# Patient Record
Sex: MALE | Race: WHITE | NOT HISPANIC OR LATINO | URBAN - METROPOLITAN AREA
[De-identification: names, ages, dates, MRNs, and addresses within clinical notes are randomized per-mention and may not be internally consistent; named-entity substitution may affect disease eponyms.]

---

## 2021-04-21 ENCOUNTER — INPATIENT (INPATIENT)
Facility: HOSPITAL | Age: 71
LOS: 1 days | Discharge: HOME | DRG: 35 | End: 2021-04-23
Attending: SPECIALIST | Admitting: SPECIALIST
Payer: MEDICARE

## 2021-04-21 VITALS
HEIGHT: 69 IN | SYSTOLIC BLOOD PRESSURE: 179 MMHG | RESPIRATION RATE: 18 BRPM | DIASTOLIC BLOOD PRESSURE: 91 MMHG | HEART RATE: 84 BPM | TEMPERATURE: 99 F | WEIGHT: 179.9 LBS | OXYGEN SATURATION: 96 %

## 2021-04-21 DIAGNOSIS — G45.3 AMAUROSIS FUGAX: ICD-10-CM

## 2021-04-21 LAB
ALBUMIN SERPL ELPH-MCNC: 4.3 G/DL — SIGNIFICANT CHANGE UP (ref 3.3–5)
ALP SERPL-CCNC: 70 U/L — SIGNIFICANT CHANGE UP (ref 40–120)
ALT FLD-CCNC: 15 U/L — SIGNIFICANT CHANGE UP (ref 10–45)
ANION GAP SERPL CALC-SCNC: 10 MMOL/L — SIGNIFICANT CHANGE UP (ref 5–17)
APPEARANCE UR: CLEAR — SIGNIFICANT CHANGE UP
APTT BLD: 35 SEC — SIGNIFICANT CHANGE UP (ref 27.5–35.5)
AST SERPL-CCNC: 18 U/L — SIGNIFICANT CHANGE UP (ref 10–40)
BACTERIA # UR AUTO: NEGATIVE — SIGNIFICANT CHANGE UP
BASE EXCESS BLDV CALC-SCNC: 0.2 MMOL/L — SIGNIFICANT CHANGE UP (ref -2–2)
BASOPHILS # BLD AUTO: 0.05 K/UL — SIGNIFICANT CHANGE UP (ref 0–0.2)
BASOPHILS NFR BLD AUTO: 0.4 % — SIGNIFICANT CHANGE UP (ref 0–2)
BILIRUB SERPL-MCNC: 0.5 MG/DL — SIGNIFICANT CHANGE UP (ref 0.2–1.2)
BILIRUB UR-MCNC: NEGATIVE — SIGNIFICANT CHANGE UP
BUN SERPL-MCNC: 20 MG/DL — SIGNIFICANT CHANGE UP (ref 7–23)
CALCIUM SERPL-MCNC: 9.3 MG/DL — SIGNIFICANT CHANGE UP (ref 8.4–10.5)
CHLORIDE SERPL-SCNC: 106 MMOL/L — SIGNIFICANT CHANGE UP (ref 96–108)
CO2 BLDV-SCNC: 26 MMOL/L — SIGNIFICANT CHANGE UP (ref 22–30)
CO2 SERPL-SCNC: 22 MMOL/L — SIGNIFICANT CHANGE UP (ref 22–31)
COLOR SPEC: SIGNIFICANT CHANGE UP
CREAT SERPL-MCNC: 0.78 MG/DL — SIGNIFICANT CHANGE UP (ref 0.5–1.3)
DIFF PNL FLD: NEGATIVE — SIGNIFICANT CHANGE UP
EOSINOPHIL # BLD AUTO: 0.11 K/UL — SIGNIFICANT CHANGE UP (ref 0–0.5)
EOSINOPHIL NFR BLD AUTO: 0.9 % — SIGNIFICANT CHANGE UP (ref 0–6)
EPI CELLS # UR: 0 /HPF — SIGNIFICANT CHANGE UP
ERYTHROCYTE [SEDIMENTATION RATE] IN BLOOD: 21 MM/HR — HIGH (ref 0–20)
GAS PNL BLDV: SIGNIFICANT CHANGE UP
GLUCOSE SERPL-MCNC: 102 MG/DL — HIGH (ref 70–99)
GLUCOSE UR QL: NEGATIVE — SIGNIFICANT CHANGE UP
HCO3 BLDV-SCNC: 25 MMOL/L — SIGNIFICANT CHANGE UP (ref 21–29)
HCT VFR BLD CALC: 44.3 % — SIGNIFICANT CHANGE UP (ref 39–50)
HGB BLD-MCNC: 14.5 G/DL — SIGNIFICANT CHANGE UP (ref 13–17)
HYALINE CASTS # UR AUTO: 0 /LPF — SIGNIFICANT CHANGE UP (ref 0–2)
IMM GRANULOCYTES NFR BLD AUTO: 0.4 % — SIGNIFICANT CHANGE UP (ref 0–1.5)
INR BLD: 1.1 RATIO — SIGNIFICANT CHANGE UP (ref 0.88–1.16)
KETONES UR-MCNC: ABNORMAL
LEUKOCYTE ESTERASE UR-ACNC: NEGATIVE — SIGNIFICANT CHANGE UP
LYMPHOCYTES # BLD AUTO: 1.33 K/UL — SIGNIFICANT CHANGE UP (ref 1–3.3)
LYMPHOCYTES # BLD AUTO: 10.9 % — LOW (ref 13–44)
MCHC RBC-ENTMCNC: 29.8 PG — SIGNIFICANT CHANGE UP (ref 27–34)
MCHC RBC-ENTMCNC: 32.7 GM/DL — SIGNIFICANT CHANGE UP (ref 32–36)
MCV RBC AUTO: 91 FL — SIGNIFICANT CHANGE UP (ref 80–100)
MONOCYTES # BLD AUTO: 0.78 K/UL — SIGNIFICANT CHANGE UP (ref 0–0.9)
MONOCYTES NFR BLD AUTO: 6.4 % — SIGNIFICANT CHANGE UP (ref 2–14)
NEUTROPHILS # BLD AUTO: 9.85 K/UL — HIGH (ref 1.8–7.4)
NEUTROPHILS NFR BLD AUTO: 81 % — HIGH (ref 43–77)
NITRITE UR-MCNC: NEGATIVE — SIGNIFICANT CHANGE UP
NRBC # BLD: 0 /100 WBCS — SIGNIFICANT CHANGE UP (ref 0–0)
PCO2 BLDV: 43 MMHG — SIGNIFICANT CHANGE UP (ref 42–55)
PH BLDV: 7.38 — SIGNIFICANT CHANGE UP (ref 7.35–7.45)
PH UR: 6 — SIGNIFICANT CHANGE UP (ref 5–8)
PLATELET # BLD AUTO: 227 K/UL — SIGNIFICANT CHANGE UP (ref 150–400)
PO2 BLDV: 40 MMHG — SIGNIFICANT CHANGE UP (ref 25–45)
POTASSIUM SERPL-MCNC: 4.1 MMOL/L — SIGNIFICANT CHANGE UP (ref 3.5–5.3)
POTASSIUM SERPL-SCNC: 4.1 MMOL/L — SIGNIFICANT CHANGE UP (ref 3.5–5.3)
PROT SERPL-MCNC: 7.6 G/DL — SIGNIFICANT CHANGE UP (ref 6–8.3)
PROT UR-MCNC: NEGATIVE — SIGNIFICANT CHANGE UP
PROTHROM AB SERPL-ACNC: 13.1 SEC — SIGNIFICANT CHANGE UP (ref 10.6–13.6)
RBC # BLD: 4.87 M/UL — SIGNIFICANT CHANGE UP (ref 4.2–5.8)
RBC # FLD: 14.6 % — HIGH (ref 10.3–14.5)
RBC CASTS # UR COMP ASSIST: 1 /HPF — SIGNIFICANT CHANGE UP (ref 0–4)
SAO2 % BLDV: 73 % — SIGNIFICANT CHANGE UP (ref 67–88)
SARS-COV-2 RNA SPEC QL NAA+PROBE: SIGNIFICANT CHANGE UP
SODIUM SERPL-SCNC: 138 MMOL/L — SIGNIFICANT CHANGE UP (ref 135–145)
SP GR SPEC: 1.01 — SIGNIFICANT CHANGE UP (ref 1.01–1.02)
TROPONIN T, HIGH SENSITIVITY RESULT: 10 NG/L — SIGNIFICANT CHANGE UP (ref 0–51)
UROBILINOGEN FLD QL: NEGATIVE — SIGNIFICANT CHANGE UP
WBC # BLD: 12.17 K/UL — HIGH (ref 3.8–10.5)
WBC # FLD AUTO: 12.17 K/UL — HIGH (ref 3.8–10.5)
WBC UR QL: 0 /HPF — SIGNIFICANT CHANGE UP (ref 0–5)

## 2021-04-21 PROCEDURE — 93010 ELECTROCARDIOGRAM REPORT: CPT | Mod: GC

## 2021-04-21 PROCEDURE — 99285 EMERGENCY DEPT VISIT HI MDM: CPT | Mod: CS,GC

## 2021-04-21 PROCEDURE — 70496 CT ANGIOGRAPHY HEAD: CPT | Mod: 26,MG

## 2021-04-21 PROCEDURE — G1004: CPT

## 2021-04-21 PROCEDURE — 70498 CT ANGIOGRAPHY NECK: CPT | Mod: 26,MG

## 2021-04-21 RX ORDER — ASPIRIN/CALCIUM CARB/MAGNESIUM 324 MG
81 TABLET ORAL DAILY
Refills: 0 | Status: DISCONTINUED | OUTPATIENT
Start: 2021-04-21 | End: 2021-04-23

## 2021-04-21 RX ORDER — TICAGRELOR 90 MG/1
90 TABLET ORAL
Refills: 0 | Status: DISCONTINUED | OUTPATIENT
Start: 2021-04-21 | End: 2021-04-23

## 2021-04-21 RX ORDER — ATORVASTATIN CALCIUM 80 MG/1
80 TABLET, FILM COATED ORAL AT BEDTIME
Refills: 0 | Status: DISCONTINUED | OUTPATIENT
Start: 2021-04-21 | End: 2021-04-23

## 2021-04-21 RX ORDER — ASPIRIN/CALCIUM CARB/MAGNESIUM 324 MG
325 TABLET ORAL ONCE
Refills: 0 | Status: DISCONTINUED | OUTPATIENT
Start: 2021-04-21 | End: 2021-04-21

## 2021-04-21 RX ORDER — ENOXAPARIN SODIUM 100 MG/ML
40 INJECTION SUBCUTANEOUS DAILY
Refills: 0 | Status: DISCONTINUED | OUTPATIENT
Start: 2021-04-21 | End: 2021-04-23

## 2021-04-21 RX ADMIN — Medication 81 MILLIGRAM(S): at 15:34

## 2021-04-21 RX ADMIN — ATORVASTATIN CALCIUM 80 MILLIGRAM(S): 80 TABLET, FILM COATED ORAL at 22:40

## 2021-04-21 RX ADMIN — TICAGRELOR 90 MILLIGRAM(S): 90 TABLET ORAL at 18:42

## 2021-04-21 NOTE — ED PROVIDER NOTE - EYES, MLM
Clear bilaterally, pupils equal, round and reactive to light. OS 20/20, OD 20/20, OU 20/20. Normal confrontation. Normal visual fields.

## 2021-04-21 NOTE — ED PROVIDER NOTE - PHYSICAL EXAMINATION
Neuro:   AAOx4  Follows commands  No dysarthria  No aphasia  PERRL, EOMI  CN II-XII grossly intact  No facial droop  Facial sensation intact B/L  Tongue midline  5/5 strength B/L UE and LE  Sensation intact BL UE and LE  No truncal ataxia  Normal finger nose finger   Ambulates without difficulty or abnormality  Romberg negative Neuro:   AAOx4  Follows commands  No dysarthria  No aphasia  PERRL, EOMI  CN II-XII grossly intact  No facial droop  Facial sensation intact B/L  Tongue midline  5/5 strength B/L UE and LE  Sensation intact BL UE and LE  No truncal ataxia  Normal finger nose finger   Ambulates without difficulty or abnormality  Romberg negative     Attn - alert, NAD, no pallor or jaundice, PERRL 3 mm, moist mm, skin - warm and dry, Lungs - clear, no w/r/r, good BS bilaterally, Cor - rr, no M, no rub, Abdo - ND, soft, NT, no HSM, no CVAT, no guarding or rebound. Extremities - no edema, no calf tenderness, distal pulses intact and symmetrical, Neuro - intact and non-focal, visual fields intact.

## 2021-04-21 NOTE — ED PROVIDER NOTE - OBJECTIVE STATEMENT
70 y/o male with a h/o HTN and left sciatica, presenting with right eye pressure/head pressure, which started 3 days ago. Patient states he lost vision at the bottom half of his right eye for a few seconds 3 days ago and no vision changes since. Pt states he has also had left leg paresthesias since this morning. Gradual onset, mild-moderate severity, no exacerbating or alleviating factors; associated symptoms include lightheadedness. Denies any chest pain, shortness of breath, nausea, vomiting, abdominal pain, weakness, fever, chills, or cough.         PMD: Dr. Gomes 70 y/o male with a h/o HTN and left sciatica, presenting with right eye pressure/head pressure, which started 3 days ago. Patient states he lost vision at the bottom half of his right eye for a few seconds 3 days ago and no vision changes since. Pt states he has also had left leg paresthesias since this morning. Gradual onset, mild-moderate severity, no exacerbating or alleviating factors; associated symptoms include lightheadedness. Denies any chest pain, shortness of breath, nausea, vomiting, abdominal pain, weakness, fever, chills, or cough.       Attn - pt seen in [facility] - agree with above - pt had episode 3 days ago with loss of vision in Right eye in lower visual field that lasted x 5 mins. no prior episodes or episodes since. pt c/o tearing and "watering" of eye since.  pt has hx of sciatica on left with intermittent "pins and needles" in left foot/leg that he has had previously and can make go away when changes position or walks.  No headache, trauma.          PMD: Dr. Gomes

## 2021-04-21 NOTE — ED ADULT TRIAGE NOTE - CHIEF COMPLAINT QUOTE
right eye pressure and headache x 3 -4 days and numbness to leg, history of sciatica.  Pt denies blurred vision, weakness, and dizziness  no arm drift,  no slurred speech, no facial droop, befast neg

## 2021-04-21 NOTE — H&P ADULT - NSHPPHYSICALEXAM_GEN_ALL_CORE
AOx3, no aphasia, no dysarthria  EOMI, VFF, v1-3 intact, no droop  Motor: no drift x 4  Sensation: intact to LT x 4  Coordination: FNF and HTS no ataxia  No extinction or inattention

## 2021-04-21 NOTE — ED PROVIDER NOTE - CLINICAL SUMMARY MEDICAL DECISION MAKING FREE TEXT BOX
DDX: TIA/CVA, infection, brain mass   Plan: labs, ECG, trop, CTA head/neck DDX: TIA/CVA, infection, brain mass   Plan: labs, ECG, trop, CTA head/neck  Attn - pt with transient Right lower visual field cut that lasted for 5 mins.  CT head, neuro consult, EKG, labs and reassess. DDX: TIA/CVA, infection, brain mass   Plan: labs, ECG, trop, CTA head/neck  Attn - pt with transient Right lower visual field cut that lasted for 5 mins.  CT head, neuro consult, EKG, labs and reassess.    Pt with TIA, amarousis fugax of right eye. No vision changes at this time.   Given aspirin.   Admitted to Dr. Clayton neurology.

## 2021-04-21 NOTE — H&P ADULT - NSHPLABSRESULTS_GEN_ALL_CORE
CBC                       14.5   12.17 )-----------( 227      ( 2021 11:44 )             44.3     Chem     138  |  106  |  20  ----------------------------<  102<H>  4.1   |  22  |  0.78    Ca    9.3      2021 11:44    TPro  7.6  /  Alb  4.3  /  TBili  0.5  /  DBili  x   /  AST  18  /  ALT  15  /  AlkPhos  70  04-21    LFTs LIVER FUNCTIONS - ( 2021 11:44 )  Alb: 4.3 g/dL / Pro: 7.6 g/dL / ALK PHOS: 70 U/L / ALT: 15 U/L / AST: 18 U/L / GGT: x           Coagulopathy PT/INR - ( 2021 11:44 )   PT: 13.1 sec;   INR: 1.10 ratio         PTT - ( 2021 11:44 )  PTT:35.0 sec  Lipid Panel   A1c   Cardiac enzymes     U/A Urinalysis Basic - ( 2021 12:37 )    Color: Light Yellow / Appearance: Clear / S.013 / pH: x  Gluc: x / Ketone: Small  / Bili: Negative / Urobili: Negative   Blood: x / Protein: Negative / Nitrite: Negative   Leuk Esterase: Negative / RBC: 1 /hpf / WBC 0 /HPF   Sq Epi: x / Non Sq Epi: 0 /hpf / Bacteria: Negative      CSF  Immunological  Other        Brain CT: No acute hemorrhage, mass effect or extra-axial collections.      Neck CTA: Ultra high grade stenosis estimated approximately 99% narrowing of the proximal right internal carotid artery with decreased flow throughout the remainder of the cervical and intracranial segments.  Bilateral vertebral artery calcifications and stenosis at the level of the takeoff.  No stenosis of the left-sided circulation.    Brain CTA:    Diffusely decreased caliber of the right internal carotid artery with a decreased filling of the right MCA branches. Patent anterior communicating artery for cross-filling.

## 2021-04-21 NOTE — H&P ADULT - ASSESSMENT
72 yo male pmhx htn and LLE sciatica pw transient vision loss, right eye inferior altitudinal, for approximately one minute.  Painless no precipitating or allievitating factors.  Denies vertigo, headache, chest pain, SOB.  CTA demonstrates proximal R ICA 99% stenosis.  Exam non-focal.  Impression: likely TIA  NIHSS 0 LKN 4/18 MRS 0    Recommendation:  - angio tomorrow 4/21  - MRI brain w/o contrast for infarct, or other organic etiology  - atorvastatin 80 mg PO daily for vascular risk modification  - c/w aspirin 81 mg PO daily for vascular risk modification  - brillinta 90 mg PO BID for vascular risk modification  - NPO after midnight for angio  - DVT ppx      Plan discussed with stroke attending Dr. Jean-Pierre Clayton

## 2021-04-21 NOTE — CONSULT NOTE ADULT - ASSESSMENT
70 yo male pmhx htn and LLE sciatica pw transient vision loss, right eye inferior altitudinal, for approximately one minute.  Painless no precipitating or allievitating factors.  Denies vertigo, headache, chest pain, SOB.  Imaging pending.  Exam non-focal.  Impression: likely TIA  NIHSS 0 LKN 4/18 MRS 0    Recommendation:  - CT / CTA H/N for possible infarct  - MRI brain w/o contrast for infarct, or other organic etiology  - Further management recommendations will be based on initial work up results   70 yo male pmhx htn and LLE sciatica pw transient vision loss, right eye inferior altitudinal, for approximately one minute.  Painless no precipitating or allievitating factors.  Denies vertigo, headache, chest pain, SOB.  Imaging pending.  Exam non-focal.  Impression: likely TIA  NIHSS 0 LKN 4/18 MRS 0    Recommendation:  - CT / CTA H/N for possible infarct  - MRI brain w/o contrast for infarct, or other organic etiology  - atorvastatin 80 mg PO daily for vascular risk modification  - c/w aspirin 81 mg PO daily for vascular risk modification  - Further management recommendations will be based on initial work up results   70 yo male pmhx htn and LLE sciatica pw transient vision loss, right eye inferior altitudinal, for approximately one minute.  Painless no precipitating or allievitating factors.  Denies vertigo, headache, chest pain, SOB.  CTA demonstrates proximal R ICA 99% stenosis.  Exam non-focal.  Impression: likely TIA  NIHSS 0 LKN 4/18 MRS 0    Recommendation:  - CT / CTA H/N for possible infarct  - MRI brain w/o contrast for infarct, or other organic etiology  - atorvastatin 80 mg PO daily for vascular risk modification  - c/w aspirin 81 mg PO daily for vascular risk modification    Follow up with Dr Clayton today  Dr. Sherwin Clayton  [address] [place] [address] [phone] 72 yo male pmhx htn and LLE sciatica pw transient vision loss, right eye inferior altitudinal, for approximately one minute.  Painless no precipitating or allievitating factors.  Denies vertigo, headache, chest pain, SOB.  CTA demonstrates proximal R ICA 99% stenosis.  Exam non-focal.  Impression: likely TIA  NIHSS 0 LKN 4/18 MRS 0    Recommendation:  - CT / CTA H/N for possible infarct  - MRI brain w/o contrast for infarct, or other organic etiology  - atorvastatin 80 mg PO daily for vascular risk modification  - c/w aspirin 81 mg PO daily for vascular risk modification    Follow up with Dr Clayton today  Dr. Sherwin Clayton  [address] [place] [address] [phone]    Case discussed with Dr. Seay

## 2021-04-21 NOTE — CONSULT NOTE ADULT - SUBJECTIVE AND OBJECTIVE BOX
HPI:    70 yo male pmhx htn and LLE sciatica pw transient vision loss, right eye inferior altitudinal, for approximately one minute.  Painless no precipitating or allievitating factors.  Denies vertigo, headache, chest pain, SOB.    NIHSS 0  LKN 18  MRS 0        REVIEW OF SYSTEMS  General:	  Skin/Breast:	  Ophthalmologic:  ENMT:	  Respiratory and Thorax:	  Cardiovascular:	  Gastrointestinal:	  Genitourinary:	  Musculoskeletal:	  Neurological:	  Psychiatric:	  Hematology/Lymphatics:	  Endocrine:	  Allergic/Immunologic:	    A 10-system ROS was performed and is negative except for those items noted above and/or in the HPI.    PAST MEDICAL & SURGICAL HISTORY:  Essential hypertension      FAMILY HISTORY:    SOCIAL HISTORY:   T/E/D:   Occupation:   Lives with:     MEDICATIONS (HOME):  Home Medications:    MEDICATIONS  (STANDING):    MEDICATIONS  (PRN):    ALLERGIES/INTOLERANCES:  Allergies  No Known Allergies    Intolerances    VITALS & EXAMINATION:  Vital Signs Last 24 Hrs  T(C): 37.2 (2021 10:48), Max: 37.2 (2021 10:48)  T(F): 98.9 (2021 10:48), Max: 98.9 (2021 10:48)  HR: 80 (2021 11:06) (80 - 84)  BP: 157/81 (2021 11:06) (157/81 - 179/91)  BP(mean): --  RR: 20 (2021 11:06) (18 - 20)  SpO2: 97% (2021 11:06) (96% - 97%)    Neurological Exam      AOx3, no aphasia, no dysarthria  EOMI, VFF, v1-3 intact, no droop  Motor: no drift x 4  Sensation: intact to LT x 4  Coordination: FNF and HTS no ataxia  No extinction or inattention        LABORATORY:  CBC                       14.5   12.17 )-----------( 227      ( 2021 11:44 )             44.3     Chem 04-21    138  |  106  |  20  ----------------------------<  102<H>  4.1   |  22  |  0.78    Ca    9.3      2021 11:44    TPro  7.6  /  Alb  4.3  /  TBili  0.5  /  DBili  x   /  AST  18  /  ALT  15  /  AlkPhos  70  04-21    LFTs LIVER FUNCTIONS - ( 2021 11:44 )  Alb: 4.3 g/dL / Pro: 7.6 g/dL / ALK PHOS: 70 U/L / ALT: 15 U/L / AST: 18 U/L / GGT: x           Coagulopathy PT/INR - ( 2021 11:44 )   PT: 13.1 sec;   INR: 1.10 ratio         PTT - ( 2021 11:44 )  PTT:35.0 sec  Lipid Panel   A1c   Cardiac enzymes     U/A Urinalysis Basic - ( 2021 12:37 )    Color: Light Yellow / Appearance: Clear / S.013 / pH: x  Gluc: x / Ketone: Small  / Bili: Negative / Urobili: Negative   Blood: x / Protein: Negative / Nitrite: Negative   Leuk Esterase: Negative / RBC: 1 /hpf / WBC 0 /HPF   Sq Epi: x / Non Sq Epi: 0 /hpf / Bacteria: Negative      CSF  Immunological  Other    STUDIES & IMAGING:   HPI:    70 yo male pmhx htn and LLE sciatica pw transient vision loss, right eye inferior altitudinal, for approximately one minute.  Painless no precipitating or allievitating factors.  Denies vertigo, headache, chest pain, SOB.    NIHSS 0  LKN 18  MRS 0        REVIEW OF SYSTEMS  General:	  Skin/Breast:	  Ophthalmologic:  ENMT:	  Respiratory and Thorax:	  Cardiovascular:	  Gastrointestinal:	  Genitourinary:	  Musculoskeletal:	  Neurological:	  Psychiatric:	  Hematology/Lymphatics:	  Endocrine:	  Allergic/Immunologic:	    A 10-system ROS was performed and is negative except for those items noted above and/or in the HPI.    PAST MEDICAL & SURGICAL HISTORY:  Essential hypertension      FAMILY HISTORY:    SOCIAL HISTORY:   T/E/D:   Occupation:   Lives with:     MEDICATIONS (HOME):  Home Medications:    MEDICATIONS  (STANDING):    MEDICATIONS  (PRN):    ALLERGIES/INTOLERANCES:  Allergies  No Known Allergies    Intolerances    VITALS & EXAMINATION:  Vital Signs Last 24 Hrs  T(C): 37.2 (2021 10:48), Max: 37.2 (2021 10:48)  T(F): 98.9 (2021 10:48), Max: 98.9 (2021 10:48)  HR: 80 (2021 11:06) (80 - 84)  BP: 157/81 (2021 11:06) (157/81 - 179/91)  BP(mean): --  RR: 20 (2021 11:06) (18 - 20)  SpO2: 97% (2021 11:06) (96% - 97%)    Neurological Exam      AOx3, no aphasia, no dysarthria  EOMI, VFF, v1-3 intact, no droop  Motor: no drift x 4  Sensation: intact to LT x 4  Coordination: FNF and HTS no ataxia  No extinction or inattention        LABORATORY:  CBC                       14.5   12.17 )-----------( 227      ( 2021 11:44 )             44.3     Chem 04-21    138  |  106  |  20  ----------------------------<  102<H>  4.1   |  22  |  0.78    Ca    9.3      2021 11:44    TPro  7.6  /  Alb  4.3  /  TBili  0.5  /  DBili  x   /  AST  18  /  ALT  15  /  AlkPhos  70  04-    LFTs LIVER FUNCTIONS - ( 2021 11:44 )  Alb: 4.3 g/dL / Pro: 7.6 g/dL / ALK PHOS: 70 U/L / ALT: 15 U/L / AST: 18 U/L / GGT: x           Coagulopathy PT/INR - ( 2021 11:44 )   PT: 13.1 sec;   INR: 1.10 ratio         PTT - ( 2021 11:44 )  PTT:35.0 sec  Lipid Panel   A1c   Cardiac enzymes     U/A Urinalysis Basic - ( 2021 12:37 )    Color: Light Yellow / Appearance: Clear / S.013 / pH: x  Gluc: x / Ketone: Small  / Bili: Negative / Urobili: Negative   Blood: x / Protein: Negative / Nitrite: Negative   Leuk Esterase: Negative / RBC: 1 /hpf / WBC 0 /HPF   Sq Epi: x / Non Sq Epi: 0 /hpf / Bacteria: Negative      CSF  Immunological  Other    STUDIES & IMAGING:  < from: CT Head No Cont (21 @ 13:46) >  IMPRESSION:    Brain CT: No acute hemorrhage, mass effect or extra-axial collections.      Neck CTA: Ultra high grade stenosis estimated approximately 99% narrowing of the proximal right internal carotid artery with decreased flow throughout the remainder of the cervical and intracranial segments.  Bilateral vertebral artery calcifications and stenosis at the level of the takeoff.  No stenosis of the left-sided circulation.    Brain CTA:    Diffusely decreased caliber of the right internal carotid artery with a decreased filling of the right MCA branches. Patent anterior communicating artery for cross-filling.

## 2021-04-21 NOTE — H&P ADULT - ATTENDING COMMENTS
VASCULAR NEUROLOGY ATTENDING  The patient is seen and examined the history and imaging are reviewed. I agree with the resident note unless otherwise noted. Presumed R hemispheric and occular ischemia from R ICA artery to artery embolism. Agree with DAPT. Plan for cerebral angiography and possible stent. MRI brain when able and early hospital discharge.

## 2021-04-21 NOTE — ED ADULT NURSE NOTE - OBJECTIVE STATEMENT
72 yo M w/ PMHx of HTN presents to ED via waiting room c/o headache/pressure behind R eye x3 days. Pt states brief episode of "loss of the bottom half of my vision in my right eye" 3 days ago, quickly returned to baseline, no changes since that time. Denies recent falls/injuries. C/o L leg minor numbness which started this morning, hx of sciatica on L side. Pt denies any CP, SOB, cough, N/V, fever, chills, urinary complaints, constipation, diarrhea, HA, dizziness, weakness. Pt A&Ox4, lungs CTA, +central pulses. Abdomen soft, not tender, not distended. Ambulating w/ steady gait, safety and comfort maintained, no acute distress noted at this time. Pt denies any recent travel or known sick contacts.

## 2021-04-21 NOTE — H&P ADULT - HISTORY OF PRESENT ILLNESS
70 yo male pmhx htn and LLE sciatica pw transient vision loss, right eye inferior altitudinal, for approximately one minute.  Painless no precipitating or allievitating factors.  Denies vertigo, headache, chest pain, SOB.    NIHSS 0  LKN 4/18  MRS 0

## 2021-04-22 ENCOUNTER — TRANSCRIPTION ENCOUNTER (OUTPATIENT)
Age: 71
End: 2021-04-22

## 2021-04-22 LAB
A1C WITH ESTIMATED AVERAGE GLUCOSE RESULT: 5.6 % — SIGNIFICANT CHANGE UP (ref 4–5.6)
ANION GAP SERPL CALC-SCNC: 11 MMOL/L — SIGNIFICANT CHANGE UP (ref 5–17)
APTT BLD: 33.8 SEC — SIGNIFICANT CHANGE UP (ref 27.5–35.5)
BLD GP AB SCN SERPL QL: NEGATIVE — SIGNIFICANT CHANGE UP
BUN SERPL-MCNC: 15 MG/DL — SIGNIFICANT CHANGE UP (ref 7–23)
CALCIUM SERPL-MCNC: 9 MG/DL — SIGNIFICANT CHANGE UP (ref 8.4–10.5)
CHLORIDE SERPL-SCNC: 107 MMOL/L — SIGNIFICANT CHANGE UP (ref 96–108)
CHOLEST SERPL-MCNC: 236 MG/DL — HIGH
CO2 SERPL-SCNC: 21 MMOL/L — LOW (ref 22–31)
COVID-19 SPIKE DOMAIN AB INTERP: NEGATIVE — SIGNIFICANT CHANGE UP
COVID-19 SPIKE DOMAIN ANTIBODY RESULT: 0.4 U/ML — SIGNIFICANT CHANGE UP
CREAT SERPL-MCNC: 0.67 MG/DL — SIGNIFICANT CHANGE UP (ref 0.5–1.3)
ESTIMATED AVERAGE GLUCOSE: 114 MG/DL — SIGNIFICANT CHANGE UP (ref 68–114)
GLUCOSE SERPL-MCNC: 95 MG/DL — SIGNIFICANT CHANGE UP (ref 70–99)
HCV AB S/CO SERPL IA: 0.16 S/CO — SIGNIFICANT CHANGE UP (ref 0–0.99)
HCV AB SERPL-IMP: SIGNIFICANT CHANGE UP
HDLC SERPL-MCNC: 53 MG/DL — SIGNIFICANT CHANGE UP
INR BLD: 1.12 RATIO — SIGNIFICANT CHANGE UP (ref 0.88–1.16)
LIPID PNL WITH DIRECT LDL SERPL: 172 MG/DL — HIGH
NON HDL CHOLESTEROL: 183 MG/DL — HIGH
PA ADP PRP-ACNC: 36 PRU — LOW (ref 194–417)
PA ADP PRP-ACNC: 44 PRU — LOW (ref 194–417)
PLATELET RESPONSE ASPIRIN RESULT: 505 ARU — SIGNIFICANT CHANGE UP (ref 350–700)
POTASSIUM SERPL-MCNC: 3.8 MMOL/L — SIGNIFICANT CHANGE UP (ref 3.5–5.3)
POTASSIUM SERPL-SCNC: 3.8 MMOL/L — SIGNIFICANT CHANGE UP (ref 3.5–5.3)
PROTHROM AB SERPL-ACNC: 13.4 SEC — SIGNIFICANT CHANGE UP (ref 10.6–13.6)
RH IG SCN BLD-IMP: POSITIVE — SIGNIFICANT CHANGE UP
SARS-COV-2 IGG+IGM SERPL QL IA: 0.4 U/ML — SIGNIFICANT CHANGE UP
SARS-COV-2 IGG+IGM SERPL QL IA: NEGATIVE — SIGNIFICANT CHANGE UP
SODIUM SERPL-SCNC: 139 MMOL/L — SIGNIFICANT CHANGE UP (ref 135–145)
TRIGL SERPL-MCNC: 54 MG/DL — SIGNIFICANT CHANGE UP

## 2021-04-22 PROCEDURE — 93306 TTE W/DOPPLER COMPLETE: CPT | Mod: 26

## 2021-04-22 PROCEDURE — 37216 TRANSCATH STENT CCA W/O EPS: CPT

## 2021-04-22 PROCEDURE — 99232 SBSQ HOSP IP/OBS MODERATE 35: CPT

## 2021-04-22 RX ORDER — DEXTROSE MONOHYDRATE, SODIUM CHLORIDE, AND POTASSIUM CHLORIDE 50; .745; 4.5 G/1000ML; G/1000ML; G/1000ML
1000 INJECTION, SOLUTION INTRAVENOUS
Refills: 0 | Status: DISCONTINUED | OUTPATIENT
Start: 2021-04-22 | End: 2021-04-23

## 2021-04-22 RX ORDER — PHENYLEPHRINE HYDROCHLORIDE 10 MG/ML
0.98 INJECTION INTRAVENOUS
Qty: 160 | Refills: 0 | Status: DISCONTINUED | OUTPATIENT
Start: 2021-04-22 | End: 2021-04-22

## 2021-04-22 RX ORDER — PHENYLEPHRINE HYDROCHLORIDE 10 MG/ML
0.6 INJECTION INTRAVENOUS
Qty: 40 | Refills: 0 | Status: DISCONTINUED | OUTPATIENT
Start: 2021-04-22 | End: 2021-04-22

## 2021-04-22 RX ORDER — SODIUM CHLORIDE 9 MG/ML
1000 INJECTION INTRAMUSCULAR; INTRAVENOUS; SUBCUTANEOUS ONCE
Refills: 0 | Status: COMPLETED | OUTPATIENT
Start: 2021-04-22 | End: 2021-04-22

## 2021-04-22 RX ADMIN — ATORVASTATIN CALCIUM 80 MILLIGRAM(S): 80 TABLET, FILM COATED ORAL at 21:10

## 2021-04-22 RX ADMIN — ENOXAPARIN SODIUM 40 MILLIGRAM(S): 100 INJECTION SUBCUTANEOUS at 21:11

## 2021-04-22 RX ADMIN — TICAGRELOR 90 MILLIGRAM(S): 90 TABLET ORAL at 05:31

## 2021-04-22 RX ADMIN — DEXTROSE MONOHYDRATE, SODIUM CHLORIDE, AND POTASSIUM CHLORIDE 50 MILLILITER(S): 50; .745; 4.5 INJECTION, SOLUTION INTRAVENOUS at 17:46

## 2021-04-22 RX ADMIN — Medication 81 MILLIGRAM(S): at 10:30

## 2021-04-22 RX ADMIN — SODIUM CHLORIDE 1000 MILLILITER(S): 9 INJECTION INTRAMUSCULAR; INTRAVENOUS; SUBCUTANEOUS at 14:38

## 2021-04-22 RX ADMIN — TICAGRELOR 90 MILLIGRAM(S): 90 TABLET ORAL at 18:31

## 2021-04-22 NOTE — PROGRESS NOTE ADULT - ASSESSMENT
ASSESSMENT/PLAN: amaurosis fugax s/p carotid stent for severe stenosis     NEURO:  neuro checks  carotid doppler  CT Head in AM, MRI post-op, Surgical drains per NSGY, Pain control  Activity: [] OOB as tolerated [] Bedrest [] PT [] OT [] PMNR    PULM:  Incentive spirometry, mobilize as tolerated    CV:  -140mmHg, d/c a-line in AM  wean of paxton gtt; 2/2 intraprocedural cardene and vagal stimulation from carotid stenting  adequate fluid resuscitation    RENAL:  IVF until good PO intake, d/c galeas in AM    GI:  Diet: Dysphagia screen and then advance diet as tolerated  GI prophylaxis [] not indicated [] PPI [] other:  Bowel regimen [] colace [] senna [] other:    ENDO:   Goal euglycemia (-180)    HEME/ONC:  VTE prophylaxis: [] SCDs [] chemoprophylaxis [] hold chemoprophylaxis due to: [] high risk of DVT/PE on admission due to:    ID:  Ирина-op antibiotics    MISC:    SOCIAL/FAMILY:  [] awaiting [] updated at bedside [] family meeting    CODE STATUS:  [] Full Code [] DNR [] DNI [] Palliative/Comfort Care    DISPOSITION:  [] ICU [] Stroke Unit [] Floor [] EMU [] RCU [] PCU    [] Patient is at high risk of neurologic deterioration/death due to:     Time seen:  Time spent: ___ [] critical care minutes    Contact: [phone] ASSESSMENT/PLAN: TIA/amaurosis fugax s/p carotid stent for severe stenosis     NEURO:  neuro checks  carotid doppler  DAPT, ARU/PRU  high dose statin    MR  Activity: [] OOB as tolerated [] Bedrest [x PT [x] OT [] PMNR    PULM:  Incentive spirometry, mobilize as tolerated    CV:  -140mmHg, d/c a-line in AM  wean of paxton gtt; 2/2 intraprocedural cardene and vagal stimulation from carotid stenting  adequate fluid resuscitation    RENAL:  IVF until good PO intake, d/c galeas in AM    GI:  Diet: Dysphagia screen and then advance diet as tolerated  GI prophylaxis [] not indicated [] PPI [] other:  Bowel regimen [] colace [] senna [] other:    ENDO:   Goal euglycemia (-180)  a1c 5.6    HEME/ONC:  VTE prophylaxis: [] SCDs [] chemoprophylaxis [] hold chemoprophylaxis due to: [] high risk of DVT/PE on admission due to:    ID:  Ирина-op antibiotics    MISC:    SOCIAL/FAMILY:  [] awaiting [] updated at bedside [] family meeting    CODE STATUS:  [] Full Code [] DNR [] DNI [] Palliative/Comfort Care    DISPOSITION:  [] ICU [] Stroke Unit [] Floor [] EMU [] RCU [] PCU    [] Patient is at high risk of neurologic deterioration/death due to:     Time seen:  Time spent: ___ [] critical care minutes    Contact: [phone] ASSESSMENT/PLAN: TIA/amaurosis fugax s/p carotid stent for severe stenosis     NEURO:  neuro checks 2 hr   carotid doppler  DAPT, ARU/PRU  high dose statin    Activity: [] OOB as tolerated [] Bedrest [x PT [x] OT [] PMNR    PULM:  Incentive spirometry, mobilize as tolerated    CV:  -140mmHg, d/c a-line in AM  wean of paxton gtt; 2/2 intraprocedural cardene and vagal stimulation from carotid stenting  adequate fluid resuscitation      RENAL:  IVF until good PO intake, d/c galeas in AM    GI:  Diet: Dysphagia screen and then advance diet as tolerated  GI prophylaxis [] not indicated [] PPI [] other:  Bowel regimen [] colace [] senna [] other:    ENDO:   Goal euglycemia (-180)  a1c 5.6    HEME/ONC:  VTE prophylaxis: [] SCDs [] chemoprophylaxis [] hold chemoprophylaxis due to: [] high risk of DVT/PE on admission due to:    ID:  Ирина-op antibiotics    MISC:    SOCIAL/FAMILY:  [] awaiting [] updated at bedside [] family meeting    CODE STATUS:  [x] Full Code [] DNR [] DNI [] Palliative/Comfort Care    DISPOSITION:  [] ICU [x] Stroke Unit [] Floor [] EMU [] RCU [] PCU    not critical moderate complex medical decision    Contact: [phone]

## 2021-04-22 NOTE — CHART NOTE - NSCHARTNOTEFT_GEN_A_CORE
Interventional Neuro Radiology  Pre-Procedure Note PA-C    This is a 71 year old right hand dominant male with a past medical history significant for LLE sciatica, who presented with transient vision loss, right eye inferior altitudinal, for approximately one minute. Patient was found to have right carotid stenosis. Patient is transported to Neuro IR for a selective cerebral angiography.    Upon exam patient is A + O x 3, speech is fluent, follows commands, moves all extremity, motor 5/5, sensation intact.  Allergies: No Known Allergies  PMHX: hypertension  PSHX:   Social History:   FAMILY HISTORY:    Current Medications: aspirin  chewable 81 milliGRAM(s) Oral daily  atorvastatin 80 milliGRAM(s) Oral at bedtime  enoxaparin Injectable 40 milliGRAM(s) SubCutaneous daily  ticagrelor 90 milliGRAM(s) Oral two times a day      Labs:                         14.5   12.17 )-----------( 227      ( 21 Apr 2021 11:44 )             44.3       04-22    139  |  107  |  15  ----------------------------<  95  3.8   |  21<L>  |  0.67    Ca    9.0      22 Apr 2021 05:58    TPro  7.6  /  Alb  4.3  /  TBili  0.5  /  DBili  x   /  AST  18  /  ALT  15  /  AlkPhos  70  04-21      Blood Bank: 0 positive       Assessment/Plan:   This is a 71 year old right hand dominant male with right carotid stenosis, o neuro-IR for selective cerebral angiography.   Procedure, goals, risks, benefits and alternatives  were discussed with patient and patient's family.  All questions were answered.  Risks include but are not limited to stroke, vessel injury, hemorrhage, and or right  groin hematoma.  Patient demonstrates understanding  of all risks involved with this procedure and wishes to continue.   Appropriate  content was obtained from patient and consent is in the patient's chart. Interventional Neuro Radiology  Pre-Procedure Note PA-C    This is a 71 year old right hand dominant male with a past medical history significant for LLE sciatica, who presented with transient vision loss, right eye inferior altitudinal, for approximately one minute. Patient was found to have right carotid stenosis. Patient is transported to Neuro IR for a selective cerebral angiography.    Upon exam patient is A + O x 3, speech is fluent, follows commands, moves all extremity, motor 5/5, sensation intact.  Allergies: No Known Allergies  PMHX: hypertension  PSHX:   Social History: former tobacco smoker   FAMILY HISTORY: non-contributory     Current Medications: aspirin  chewable 81 milliGRAM(s) Oral daily  atorvastatin 80 milliGRAM(s) Oral at bedtime  enoxaparin Injectable 40 milliGRAM(s) SubCutaneous daily  ticagrelor 90 milliGRAM(s) Oral two times a day      Labs:                         14.5   12.17 )-----------( 227      ( 21 Apr 2021 11:44 )             44.3       04-22    139  |  107  |  15  ----------------------------<  95  3.8   |  21<L>  |  0.67    Ca    9.0      22 Apr 2021 05:58    TPro  7.6  /  Alb  4.3  /  TBili  0.5  /  DBili  x   /  AST  18  /  ALT  15  /  AlkPhos  70  04-21    Blood Bank: 0 positive     Assessment/Plan:   This is a 71 year old right hand dominant male with symptomatic right carotid stenosis, patient is transported to Neuro-IR for selective cerebral angiography. Procedure, goals, risks, benefits and alternatives were discussed with patient. All questions were answered. Risks include but are not limited to stroke, vessel injury, hemorrhage, and or right groin hematoma. Patient demonstrates understanding of all risks involved with this procedure and wishes to continue.  Appropriate content was obtained from patient and consent is in the patient's chart.

## 2021-04-22 NOTE — CHART NOTE - NSCHARTNOTEFT_GEN_A_CORE
Interventional Neuro Radiology  Pre-Procedure Note    This is a 70 yo male pmhx htn and LLE sciatica pw transient vision loss, right eye inferior altitudinal, for approximately one minute.  Painless no precipitating or alleviating factors.  Denies vertigo, headache, chest pain, SOB.    NIHSS 0  LKN 4/18  MRS 0      Neuro Exam: Awake and alert, oriented x3, fluent, normal naming and repetition, follows 3 step commands. Extraocular movements intact, no nystagmus, visual fields full, face symmetric, tongue midline. No drift, 5/5 power x 4 extremities. Normal sensation to LT. Normal finger-to-nose and rapid alternating movements.    PAST MEDICAL & SURGICAL HISTORY:  Essential hypertension        Social History:   Denies tobacco use    FAMILY HISTORY:    No pertinent family history    Allergies: No Known Allergies      Current Medications: aspirin  chewable 81 milliGRAM(s) Oral daily  atorvastatin 80 milliGRAM(s) Oral at bedtime  enoxaparin Injectable 40 milliGRAM(s) SubCutaneous daily  ticagrelor 90 milliGRAM(s) Oral two times a day      Labs:                         14.5   12.17 )-----------( 227      ( 21 Apr 2021 11:44 )             44.3       04-22    139  |  107  |  15  ----------------------------<  95  3.8   |  21<L>  |  0.67    Ca    9.0      22 Apr 2021 05:58    TPro  7.6  /  Alb  4.3  /  TBili  0.5  /  DBili  x   /  AST  18  /  ALT  15  /  AlkPhos  70  04-21        Blood Bank: 04-22-21  O  --  Positive      Assessment/Plan:   This is a 72y/o Male who presents with right carotid stenosis. Patient presents to neuro-IR for selective cerebral angiography, balloon angioplasty and right carotid stent placement. Procedure/ risks/ benefits/ goals/ alternatives were explained. Risks include but are not limited to stroke/ vessel injury/ hemorrhage/ groin hematoma. All questions answered. Informed content obtained from patient. Consent placed in chart.     Kaitlin Molina PA-C  [phone]

## 2021-04-22 NOTE — DISCHARGE NOTE PROVIDER - NSDCCPCAREPLAN_GEN_ALL_CORE_FT
PRINCIPAL DISCHARGE DIAGNOSIS  Diagnosis: Carotid stenosis  Assessment and Plan of Treatment: You underwent conventional angiography with stent placement in right carotid artery.  Please follow up with Dr. Clemente Wise for your carotid stent.      SECONDARY DISCHARGE DIAGNOSES  Diagnosis: Hypertension  Assessment and Plan of Treatment:      PRINCIPAL DISCHARGE DIAGNOSIS  Diagnosis: Carotid stenosis  Assessment and Plan of Treatment: You underwent conventional angiography with stent placement in right carotid artery.  Please follow up with Dr. Clemente Wise for your carotid stent.  Start taking Brillinta 90mg in addition to your aspirin. Start taking atorvastatin 80mg once daily at night. Continue taking your home medications as prescribed. Monitor your blood pressure. Reduce fat, cholesterol and salt in your diet. Increase intake of fruits and vegetables. Limit alcohol to minimum and do not smoke. Keep up to date on vaccinations.  If you experience any symptoms of facial drooping, slurred speech, arm or leg weakness, severe headache, vision changes or any worsening symptoms, notify provider immediatley and return to ER.        SECONDARY DISCHARGE DIAGNOSES  Diagnosis: Hypertension  Assessment and Plan of Treatment: Continue taking your home medications. Monitor your blood pressure regularly.     PRINCIPAL DISCHARGE DIAGNOSIS  Diagnosis: Carotid stenosis  Assessment and Plan of Treatment: You underwent conventional angiography with stent placement in right carotid artery.  Please follow up with Dr. Clemente Wise for your carotid stent.  Start taking Brillinta 90mg twice a day 12 hours apart in addition to your aspirin 81mg which you should take once a day. Start taking atorvastatin 80mg once daily at night. Continue taking your home medications as prescribed. Monitor your blood pressure. Reduce fat, cholesterol and salt in your diet. Increase intake of fruits and vegetables. Limit alcohol to minimum and do not smoke. Keep up to date on vaccinations.  If you experience any symptoms of facial drooping, slurred speech, arm or leg weakness, severe headache, vision changes or any worsening symptoms, notify provider immediatley and return to ER.  Avoid any heavy lifting, straining for 1 week. Avoid hot tubs, pools, saunas, steam rooms for 1 week.        SECONDARY DISCHARGE DIAGNOSES  Diagnosis: Hypertension  Assessment and Plan of Treatment: Continue taking your home medications. Monitor your blood pressure regularly.

## 2021-04-22 NOTE — CONSULT NOTE ADULT - SUBJECTIVE AND OBJECTIVE BOX
Patient is a 71y old  Male who presents with a chief complaint of 99% R ICA stenosis (2021 15:46)    Admission HPI:  70 yo male pmhx htn and LLE sciatica pw transient vision loss, right eye inferior altitudinal, for approximately one minute.  Painless no precipitating or allievitating factors.  Denies vertigo, headache, chest pain, SOB.    NIHSS 0  LKN /18  MRS 0   (2021 15:46)    Interval History:  Patient had imaging     CT Head No Cont (21 @ 13:46) >  Brain CT: No acute hemorrhage, mass effect or extra-axial collections.    Neck CTA: Ultra high grade stenosis estimated approximately 99% narrowing of the proximal right internal carotid artery with decreased flow throughout the remainder of the cervical and intracranial segments.  Bilateral vertebral artery calcifications and stenosis at the level of the takeoff.  No stenosis of the left-sided circulation.    Brain CTA:  Diffusely decreased caliber of the right internal carotid artery with a decreased filling of the right MCA branches. Patent anterior communicating artery for cross-filling.      REVIEW OF SYSTEMS: No chest pain, shortness of breath, nausea, vomiting or diarhea; other ROS neg     PAST MEDICAL & SURGICAL HISTORY  Essential hypertension    FUNCTIONAL HISTORY:   Lives   Independent    CURRENT FUNCTIONAL STATUS:      FAMILY HISTORY       MEDICATIONS   aspirin  chewable 81 milliGRAM(s) Oral daily  atorvastatin 80 milliGRAM(s) Oral at bedtime  enoxaparin Injectable 40 milliGRAM(s) SubCutaneous daily  ticagrelor 90 milliGRAM(s) Oral two times a day      ALLERGIES  No Known Allergies      VITALS  T(C): 36.9 (21 @ 22:30), Max: 37.2 (21 @ 10:48)  HR: 79 (21 @ 08:00) (64 - 88)  BP: 137/84 (21 @ 08:00) (137/84 - 179/91)  RR: 18 (21 @ 08:00) (12 - 20)  SpO2: 98% (21 @ 08:00) (93% - 98%)  Wt(kg): --    PHYSICAL EXAM  Constitutional - NAD, Comfortable  HEENT - NCAT, EOMI  Neck - Supple, No limited ROM  Chest - CTA bilaterally, No wheeze, No rhonchi, No crackles  Cardiovascular - RRR, S1S2, No murmurs  Abdomen - BS+, Soft, NTND  Extremities - No C/C/E, No calf tenderness   Neurologic Exam -                    Cognitive - Awake, Alert, AAO to self, place, date, year, situation     Communication - Fluent, No dysarthria, no aphasia     Cranial Nerves - CN 2-12 intact     Motor - No focal deficits      Sensory - Intact to LT     Reflexes - DTR Intact, No primitive reflexive  Psychiatric - Mood stable, Affect WNL    RECENT LABS/IMAGING  CBC Full  -  ( 2021 11:44 )  WBC Count : 12.17 K/uL  RBC Count : 4.87 M/uL  Hemoglobin : 14.5 g/dL  Hematocrit : 44.3 %  Platelet Count - Automated : 227 K/uL  Mean Cell Volume : 91.0 fl  Mean Cell Hemoglobin : 29.8 pg  Mean Cell Hemoglobin Concentration : 32.7 gm/dL  Auto Neutrophil # : 9.85 K/uL  Auto Lymphocyte # : 1.33 K/uL  Auto Monocyte # : 0.78 K/uL  Auto Eosinophil # : 0.11 K/uL  Auto Basophil # : 0.05 K/uL  Auto Neutrophil % : 81.0 %  Auto Lymphocyte % : 10.9 %  Auto Monocyte % : 6.4 %  Auto Eosinophil % : 0.9 %  Auto Basophil % : 0.4 %    -22    139  |  107  |  15  ----------------------------<  95  3.8   |  21<L>  |  0.67    Ca    9.0      2021 05:58    TPro  7.6  /  Alb  4.3  /  TBili  0.5  /  DBili  x   /  AST  18  /  ALT  15  /  AlkPhos  70  04-21    Urinalysis Basic - ( 2021 12:37 )    Color: Light Yellow / Appearance: Clear / S.013 / pH: x  Gluc: x / Ketone: Small  / Bili: Negative / Urobili: Negative   Blood: x / Protein: Negative / Nitrite: Negative   Leuk Esterase: Negative / RBC: 1 /hpf / WBC 0 /HPF   Sq Epi: x / Non Sq Epi: 0 /hpf / Bacteria: Negative    Impression:  70 yo admitted with visual loss    Plan:  Continue neurology work up and treatment.  PT- ROM, Bed Mob, Transfers, Amb w AD and bracing as needed  OT- ADLs, bracing  SLP- Dysphagia eval and treat  Prec- Falls, Cardiac  DVT Prophylaxis  Skin- Turn q2 h  Dispo-

## 2021-04-22 NOTE — PRE-OP CHECKLIST - BP NONINVASIVE DIASTOLIC (MM HG)
Ambulatory Care Management  Follow up Outreach Note   Outreach type: Phone call: Yes     Date/Time of Outreach: 11/30/20, 1504     Reason for follow-up:   Continued outreach   Disease specific complaints/issues: None     Patient progress towards goals set from last contact:   Stable   Has patient attended any PCP or specialist follow-up appointments since last contact? What was outcome of appointment? When is next follow-up scheduled? Pt reports Dr Tereza Flynn no longer in network w/ her insurance, working on another PCP. Declined this RNs offer of assistance. Review medications. Any medication changes since last outreach? Does patient have any questions or issues related to their medications? Reports took some [place] from ED for \"side pain\". It is better. Home health active? If yes  any issue? Progress? NA   Referrals needed?  (SW, Diabetes education, HH, etc. ) NA   Other issues/Miscellaneous? (Transportation, access to meals, ability to perform ADLs, adequate caregiver support, etc.)   Discussed most recent A1C = 8.9, reviewed low carb diet. Support, encouragement offered.          Next Outreach Scheduled: 1-2 weeks     Next Steps/Goals:   F/U   Ambulatory Care Manager/ LPN Care Coordinator: Anita Rucker RN [phone]

## 2021-04-22 NOTE — PROGRESS NOTE ADULT - SUBJECTIVE AND OBJECTIVE BOX
SUMMARY: 72 yo male pmhx htn and LLE sciatica pw transient vision loss, right eye inferior altitudinal, for approximately one minute, found to have  Ultra high grade stenosis estimated approximately 99% narrowing of the proximal right internal carotid artery with decreased flow throughout the remainder of the cervical and intracranial segments on CTA; scheduled for carotid stent on 4/22     REVIEW OF SYSTEMS:    VITALS: [] Reviewed    IMAGING/DATA: [] Reviewed    IVF FLUIDS/MEDICATIONS: [] Reviewed    ALLERGIES: Allergies    No Known Allergies    Intolerances        DEVICES:   [] Restraints [] PIVs [] ET tube [] central line [] PICC [] arterial line [] galeas [] NGT/OGT [] EVD [] LD [] GIANNA/HMV [] LiCOX [] ICP monitor [] Trach [] PEG [] Chest Tube [] other:    EXAMINATION:  General: No acute distress  HEENT: Anicteric sclerae  Cardiac: [id number]  Lungs: Clear  Abdomen: Soft, non-tender, +BS  Extremities: No c/c/e  Skin/Incision Site: Clean, dry and intact; groin safeguard without any hematoma or ecchymosis  Neurologic: Awake, alert, fully oriented, follows commands, PERRL, VFFtc, EOMI, face symmetric, tongue midline, no drift, full strength              ICU Vital Signs Last 24 Hrs  T(C): 36.9 (22 Apr 2021 13:10), Max: 36.9 (21 Apr 2021 14:50)  T(F): 98.5 (22 Apr 2021 13:10), Max: 98.5 (21 Apr 2021 22:30)  HR: 71 (22 Apr 2021 14:15) (63 - 92)  BP: 129/59 (22 Apr 2021 14:15) (93/54 - 157/86)  BP(mean): 85 (22 Apr 2021 14:15) (68 - 103)  ABP: 88/52 (22 Apr 2021 14:15) (88/52 - 139/57)  ABP(mean): 60 (22 Apr 2021 14:15) (60 - 85)  RR: 13 (22 Apr 2021 14:15) (12 - 20)  SpO2: 96% (22 Apr 2021 14:15) (93% - 98%)            aspirin  chewable 81 milliGRAM(s) Oral daily  atorvastatin 80 milliGRAM(s) Oral at bedtime  enoxaparin Injectable 40 milliGRAM(s) SubCutaneous daily  phenylephrine    Infusion 0.6 MICROgram(s)/kG/Min (18.4 mL/Hr) IV Continuous <Continuous>  phenylephrine    Infusion 0.98 MICROgram(s)/kG/Min (15 mL/Hr) IV Continuous <Continuous>  ticagrelor 90 milliGRAM(s) Oral two times a day      LABS:  Na: 139 (04-22 @ 05:58), 138 (04-21 @ 11:44)  K: 3.8 (04-22 @ 05:58), 4.1 (04-21 @ 11:44)  Cl: 107 (04-22 @ 05:58), 106 (04-21 @ 11:44)  CO2: 21 (04-22 @ 05:58), 22 (04-21 @ 11:44)  BUN: 15 (04-22 @ 05:58), 20 (04-21 @ 11:44)  Cr: 0.67 (04-22 @ 05:58), 0.78 (04-21 @ 11:44)  Glu: 95(04-22 @ 05:58), 102(04-21 @ 11:44)    Hgb: 14.5 (04-21 @ 11:44)  Hct: 44.3 (04-21 @ 11:44)  WBC: 12.17 (04-21 @ 11:44)  Plt: 227 (04-21 @ 11:44)    INR: 1.12 04-22-21 @ 05:58, 1.10 04-21-21 @ 11:44  PTT: 33.8 04-22-21 @ 05:58, 35.0 04-21-21 @ 11:44          LIVER FUNCTIONS - ( 21 Apr 2021 11:44 )  Alb: 4.3 g/dL / Pro: 7.6 g/dL / ALK PHOS: 70 U/L / ALT: 15 U/L / AST: 18 U/L / GGT: x                SUMMARY: 72 yo male pmhx htn and LLE sciatica pw transient vision loss, right eye inferior altitudinal, for approximately one minute, found to have  Ultra high grade stenosis estimated approximately 99% narrowing of the proximal right internal carotid artery with decreased flow throughout the remainder of the cervical and intracranial segments on CTA; scheduled for carotid stent on 4/22     PAST MEDICAL & SURGICAL HISTORY:  Essential hypertension    Allergies    No Known Allergies    Intolerances            REVIEW OF SYSTEMS: [ ] Unable to Assess due to neurologic exam   x[ ] All ROS addressed below are non-contributory, except:  Neuro: [ ] Headache [ ] Back pain [ ] Numbness [ ] Weakness [ ] Ataxia [ ] Dizziness [ ] Aphasia [ ] Dysarthria [ ] Visual disturbance  Resp: [ ] Shortness of breath/dyspnea, [ ] Orthopnea [ ] Cough  CV: [ ] Chest pain [ ] Palpitation [ ] Lightheadedness [ ] Syncope  Renal: [ ] Thirst [ ] Edema  GI: [ ] Nausea [ ] Emesis [ ] Abdominal pain [ ] Constipation [ ] Diarrhea  Hem: [ ] Hematemesis [ ] bright red blood per rectum  ID: [ ] Fever [ ] Chills [ ] Dysuria  ENT: [ ] Rhinorrhea        T(C): 36.9 (04-22-21 @ 16:00), Max: 36.9 (04-21-21 @ 22:30)  HR: 63 (04-22-21 @ 16:30) (62 - 92)  BP: 98/53 (04-22-21 @ 16:30) (93/54 - 157/86)  RR: 12 (04-22-21 @ 16:30) (12 - 19)  SpO2: 96% (04-22-21 @ 16:30) (93% - 98%)aspirin  chewable 81 milliGRAM(s) Oral daily  atorvastatin 80 milliGRAM(s) Oral at bedtime  enoxaparin Injectable 40 milliGRAM(s) SubCutaneous daily  sodium chloride 0.9% with potassium chloride 20 mEq/L 1000 milliLiter(s) IV Continuous <Continuous>  ticagrelor 90 milliGRAM(s) Oral two times a day      IMAGING/DATA: [] Reviewed    IVF FLUIDS/MEDICATIONS: [] Reviewed    ALLERGIES: Allergies    No Known Allergies    Intolerances        DEVICES:   [] Restraints [] PIVs [] ET tube [] central line [] PICC [] arterial line [] galeas [] NGT/OGT [] EVD [] LD [] GIANNA/HMV [] LiCOX [] ICP monitor [] Trach [] PEG [] Chest Tube [] other:    EXAMINATION:  General: No acute distress  HEENT: Anicteric sclerae  Cardiac: [id number]  Lungs: Clear  Abdomen: Soft, non-tender, +BS  Extremities: No c/c/e  Skin/Incision Site: Clean, dry and intact; groin safeguard without any hematoma or ecchymosis  Neurologic: Awake, alert, fully oriented, follows commands, PERRL, VFFtc, EOMI, face symmetric, tongue midline, no drift, full strength              ICU Vital Signs Last 24 Hrs  T(C): 36.9 (22 Apr 2021 13:10), Max: 36.9 (21 Apr 2021 14:50)  T(F): 98.5 (22 Apr 2021 13:10), Max: 98.5 (21 Apr 2021 22:30)  HR: 71 (22 Apr 2021 14:15) (63 - 92)  BP: 129/59 (22 Apr 2021 14:15) (93/54 - 157/86)  BP(mean): 85 (22 Apr 2021 14:15) (68 - 103)  ABP: 88/52 (22 Apr 2021 14:15) (88/52 - 139/57)  ABP(mean): 60 (22 Apr 2021 14:15) (60 - 85)  RR: 13 (22 Apr 2021 14:15) (12 - 20)  SpO2: 96% (22 Apr 2021 14:15) (93% - 98%)            aspirin  chewable 81 milliGRAM(s) Oral daily  atorvastatin 80 milliGRAM(s) Oral at bedtime  enoxaparin Injectable 40 milliGRAM(s) SubCutaneous daily  phenylephrine    Infusion 0.6 MICROgram(s)/kG/Min (18.4 mL/Hr) IV Continuous <Continuous>  phenylephrine    Infusion 0.98 MICROgram(s)/kG/Min (15 mL/Hr) IV Continuous <Continuous>  ticagrelor 90 milliGRAM(s) Oral two times a day      LABS:  Na: 139 (04-22 @ 05:58), 138 (04-21 @ 11:44)  K: 3.8 (04-22 @ 05:58), 4.1 (04-21 @ 11:44)  Cl: 107 (04-22 @ 05:58), 106 (04-21 @ 11:44)  CO2: 21 (04-22 @ 05:58), 22 (04-21 @ 11:44)  BUN: 15 (04-22 @ 05:58), 20 (04-21 @ 11:44)  Cr: 0.67 (04-22 @ 05:58), 0.78 (04-21 @ 11:44)  Glu: 95(04-22 @ 05:58), 102(04-21 @ 11:44)    Hgb: 14.5 (04-21 @ 11:44)  Hct: 44.3 (04-21 @ 11:44)  WBC: 12.17 (04-21 @ 11:44)  Plt: 227 (04-21 @ 11:44)    INR: 1.12 04-22-21 @ 05:58, 1.10 04-21-21 @ 11:44  PTT: 33.8 04-22-21 @ 05:58, 35.0 04-21-21 @ 11:44          LIVER FUNCTIONS - ( 21 Apr 2021 11:44 )  Alb: 4.3 g/dL / Pro: 7.6 g/dL / ALK PHOS: 70 U/L / ALT: 15 U/L / AST: 18 U/L / GGT: x

## 2021-04-22 NOTE — OCCUPATIONAL THERAPY INITIAL EVALUATION ADULT - PERTINENT HX OF CURRENT PROBLEM, REHAB EVAL
72 yo male pmhx htn and LLE sciatica pw transient vision loss, right eye inferior altitudinal, for approximately one minute.  Painless no precipitating or alleviating factors.  Denies vertigo, headache, chest pain, SOB.

## 2021-04-22 NOTE — DISCHARGE NOTE PROVIDER - NSDCMRMEDTOKEN_GEN_ALL_CORE_FT
amLODIPine 10 mg oral tablet: 1 tab(s) orally once a day  aspirin 81 mg oral tablet: 1 tab(s) orally once a day  losartan 100 mg oral tablet: 1 tab(s) orally once a day   amLODIPine 10 mg oral tablet: 1 tab(s) orally once a day  aspirin 81 mg oral tablet: 1 tab(s) orally once a day  atorvastatin 80 mg oral tablet: 1 tab(s) orally once a day (at bedtime)  losartan 100 mg oral tablet: 1 tab(s) orally once a day  ticagrelor 90 mg oral tablet: 1 tab(s) orally 2 times a day

## 2021-04-22 NOTE — CHART NOTE - NSCHARTNOTESELECT_GEN_ALL_CORE
Event Note
Interventional Neuro Radiology/Event Note
family contact/Event Note
Event Note
VTE ppx/Event Note

## 2021-04-22 NOTE — OCCUPATIONAL THERAPY INITIAL EVALUATION ADULT - DIAGNOSIS, OT EVAL
Patient with deficits in ADL status and functional mobility due to impaired balance, strength, ROM, coordination Pt I in all ADLs, functioning as at baseline

## 2021-04-22 NOTE — CHART NOTE - NSCHARTNOTEFT_GEN_A_CORE
Interventional Neuro- Radiology   Procedure Note PA-JEMMA    Procedure: Selective Cerebral Angiography   Pre- Procedure Diagnosis:  Post- Procedure Diagnosis:    : Dr Clemente Wise   Physician Assistant: Bibiana Flores PA-C    Nurse:  Radiologic Tech:  Anesthesiologist:  Sheath:      I/Os: EBL less than 10cc  IV fluids:     cc Urine output     cc  Contrast Omnipaque 240             Vitals: BP         HR      Spo2 100%          Preliminary Report:  Using a 5 Nepali long sheath to the right groin under MAC sedation via left vertebral artery,  left internal carotid artery, left external carotid artery, right vertebral artery, right internal carotid artery, right external carotid artery a selective cerebral angiography was performed and demonstrated                       Official note to follow.  Patient tolerated procedure well, hemodynamically stable, no change in neurological status compared to baseline.  Results discussed with neuro ICU team, patient and patient's family. Right groin sheath was removed, manual compression held to hemostasis for 20 minutes, no active bleeding, no hematoma, quick clot and safeguard balloon dressing applied at Interventional Neuro- Radiology   Procedure Note PA-C    Procedure: Selective Cerebral Angiography   Pre- Procedure Diagnosis:  Post- Procedure Diagnosis:    : Dr Clemente Wise   Physician Assistant: Bibiana Flores PA-C    Nurse:                   Jr Marquez RN  Radiologic Tech:    Meng Rubio LRT         Clemente Man LRT  Anesthesiologist:   Dr Phu Caputo   Sheath:                       I/Os: EBL less than 10cc  IV fluids:     cc Urine output     cc  Contrast Omnipaque 240             Vitals: BP         HR      Spo2 100%          Preliminary Report:  Using a 5 Syrian long sheath to the right groin under MAC sedation via left vertebral artery,  left internal carotid artery, left external carotid artery, right vertebral artery, right internal carotid artery, right external carotid artery a selective cerebral angiography was performed and demonstrated                       Official note to follow.  Patient tolerated procedure well, hemodynamically stable, no change in neurological status compared to baseline.  Results discussed with neuro ICU team, patient and patient's family. Right groin sheath was removed, manual compression held to hemostasis for 20 minutes, no active bleeding, no hematoma, quick clot and safeguard balloon dressing applied at Interventional Neuro- Radiology   Procedure Note PA-C    Procedure: Selective Cerebral Angiography   Pre- Procedure Diagnosis: carotid stenosis   Post- Procedure Diagnosis: improved severe right carotid stenosis, status post carotid wall stent placement      : Dr Clemente Wise   Physician Assistant: Bibiana Flores PA-C    Nurse:                   Jr Marquez RN  Radiologic Tech:    Meng Rubio LRT         Clemente Man LRT  Anesthesiologist:   Dr Phu Caputo   Sheath:                  6 Citizen of the [place] BMX sheath               I/Os: EBL less than 10cc  IV fluids: 500cc Urine due to void Contrast Omnipaque 240  64cc     Heparin 5,ooo units          Ancef 2 grams     [place] balloon inflation to 8 LEXIS        Srinivas balloon inflation  to 10 LEXIS       Vitals: /66         HR 66      Spo2 100%          Preliminary Report:  Using a 6 Citizen of the [place] BMX sheath to the right groin under MAC sedation via left internal carotid artery, left external carotid artery, right common carotid, right internal carotid artery, right external carotid artery a selective cerebral angiography was performed and demonstrated severe right carotid stenosis. Patient underwent successful right carotid wall stent placement. Official note to follow.  Patient tolerated procedure well, but remained hypotensive status post angioplasty, requiring pressors. Results discussed with neuro ICU team, patient and patient's nephew. Right groin sheath was removed, manual compression held to hemostasis for 20 minutes, no active bleeding, no hematoma, quick clot and safeguard balloon dressing applied at 12:45pm. Interventional Neuro- Radiology   Procedure Note PA-C    Procedure: Selective Cerebral Angiography   Pre- Procedure Diagnosis: critical carotid stenosis   Post- Procedure Diagnosis: improved severe right carotid stenosis, status post carotid wall stent placement      : Dr Clemente Wise   Physician Assistant: Bibiana Flores PA-C    Nurse:                   Jr Marquez RN  Radiologic Tech:    Meng Rubio LRT         Clemente Man LRT  Anesthesiologist:   Dr Phu Caputo   Sheath:                  6 Eritrean BMX sheath               I/Os: EBL less than 10cc  IV fluids: 500cc Urine due to void Contrast Omnipaque 240  64cc     Heparin 5,ooo units          Ancef 2 grams     [place] balloon inflation to 8 LEXIS        Srinivas balloon inflation  to 10 LEXIS       Vitals: /66         HR 66      Spo2 100%          Preliminary Report:  Using a 6 Eritrean BMX sheath to the right groin under MAC sedation via left internal carotid artery, left external carotid artery, right common carotid, right internal carotid artery, right external carotid artery a selective cerebral angiography was performed and demonstrated critical greater than 95% flow limiting right carotid stenosis. Patient underwent successful right carotid wall stent placement. Official note to follow.  Patient tolerated procedure well, but remained hypotensive status post angioplasty, requiring pressors. Results discussed with neuro ICU team, patient and patient's nephew. Right groin sheath was removed, manual compression held to hemostasis for 20 minutes, no active bleeding, no hematoma, quick clot and safeguard balloon dressing applied at 12:45pm. Interventional Neuro- Radiology   Procedure Note PA-C    Procedure: Selective Cerebral Angiography   Pre- Procedure Diagnosis: critical carotid stenosis   Post- Procedure Diagnosis: improved severe right carotid stenosis, status post carotid wall stent placement      : Dr Clemente Wise   Physician Assistant: Bibiana Flores PA-C    Nurse:                   Jr Marquez RN  Radiologic Tech:   Meng Rubio LRT         Clemente Man LRT  Anesthesiologist:   Dr Phu Caputo   Sheath:                  6 Egyptian BMX sheath               I/Os: EBL less than 10cc  IV fluids: 500cc Urine due to void Contrast Omnipaque 240  64cc     Heparin 5,ooo units          Ancef 2 grams     [place] balloon inflation to 8 LEXIS        Srinivas balloon inflation  to 10 LEXIS       Vitals: /66         HR 66      Spo2 100%          Preliminary Report:  Using a 6 Egyptian BMX sheath to the right groin under MAC sedation via left internal carotid artery, left external carotid artery, right common carotid, right internal carotid artery, right external carotid artery a selective cerebral angiography was performed and demonstrated critical greater than 95% flow limiting right carotid stenosis. Patient underwent successful right carotid wall stent placement. Official note to follow.  Patient tolerated procedure well, but remained hypotensive status post angioplasty, requiring pressors. Results discussed with neuro ICU team, patient and patient's nephew. Right groin sheath was removed, manual compression held to hemostasis for 20 minutes, no active bleeding, no hematoma, quick clot and safeguard balloon dressing applied at 12:45pm.

## 2021-04-22 NOTE — DISCHARGE NOTE PROVIDER - CARE PROVIDERS DIRECT ADDRESSES
[email or web address]

## 2021-04-22 NOTE — CHART NOTE - NSCHARTNOTEFT_GEN_A_CORE
MR. Johansen is 70 y/o man s/p carotid stent placement, now off pressor support x 3 hours per PACU. States he feels well, no reported symptoms.  Patient seen and examined, neurologically without acute changes noted from prior exam. /50 HR 65 RR 12 2 97%, awake, alert, interactive with good affect, oriented to person, place, time events, able to ID objects and their function, repetition intact, no facial palsy noted, no dysarthria, EOMI, BRYSON b/l, RIVERA well against gravity with no notable weakness (RLE limited due to procedure), groin site with dressing intact, no active bleeding, no hematoma, soft throughout, no ecchymosis, sensation intact, 2+ pedal pulses, no cyanosis, edema, pallor, extremities warm, no dysmetria appreciated. SBP control as recommended by IR/ICU. D/W DR. Wise A-line to remain intact for close BP monitoring in stroke unit. DAPT as ordered. Additionally pending P2Y12/ARU/carotid duplex in am. Close monitoring , RN to notify if any changes noted. Also patient's family called to discuss care, per patient he will update them and has updated them, offered to do so as well but deferred at this time. Questions and concerns addressed.

## 2021-04-22 NOTE — PROGRESS NOTE ADULT - ATTENDING COMMENTS
TIA/amaurosis fugax s/p carotid stent for severe stenosis   hypotensive post-op due to the baroreceptors in the carotid artery  on phenylephrine for -150 mmhg, MAP> 65, will give 500 ml bolus and wean off phenyleprhine  continue aspirin and ticagrelor per stroke team   continue statin   monitor for reperfusion injury   no groin hematoma, monitor groin and pulses

## 2021-04-22 NOTE — OCCUPATIONAL THERAPY INITIAL EVALUATION ADULT - ADDITIONAL COMMENTS
CT head 4/21-Brain CT: No acute hemorrhage, mass effect or extra-axial collections. Neck CTA: Ultra high grade stenosis estimated approximately 99% narrowing of the proximal right internal carotid artery with decreased flow throughout the remainder of the cervical and intracranial segments. Bilateral vertebral artery calcifications and stenosis at the level of the takeoff. No stenosis of the left-sided circulation. Brain CTA: Diffusely decreased caliber of the right internal carotid artery with a decreased filling of the right MCA branches. Patent anterior communicating artery for cross-filling.

## 2021-04-22 NOTE — DISCHARGE NOTE PROVIDER - HOSPITAL COURSE
72 yo male pmhx htn and LLE sciatica pw transient vision loss, right eye inferior altitudinal, for approximately one minute.  Painless no precipitating or allievitating factors.  Denies vertigo, headache, chest pain, SOB.    NIHSS 0  LKN 4/18  MRS 0    Hospital course includes patient undergoing conventional angiography with stent placement. Prior to angiography patient started on aspirin 81mg daily and brilinta 90mg bid, which was continued post-procedure.     Latest P2Y12 and ARU were [].    CT Head No Cont/CTA H & N (04.21.21 @ 13:46)    IMPRESSION:    Brain CT: No acute hemorrhage, mass effect or extra-axial collections.      Neck CTA: Ultra high grade stenosis estimated approximately 99% narrowing of the proximal right internal carotid artery with decreased flow throughout the remainder of the cervical and intracranial segments.  Bilateral vertebral artery calcifications and stenosis at the level of the takeoff.  No stenosis of the left-sided circulation.    Brain CTA:    Diffusely decreased caliber of the right internal carotid artery with a decreased filling of the right MCA branches. Patent anterior communicating artery for cross-filling.    Patient deemed neurologically stable for discharge to home.      72 yo male pmhx htn and LLE sciatica pw transient vision loss, right eye inferior altitudinal, for approximately one minute.  Painless no precipitating or allievitating factors.  Denies vertigo, headache, chest pain, SOB.    NIHSS 0  LKN 4/18  MRS 0    Hospital course includes patient undergoing conventional angiography with stent placement. Prior to angiography patient started on aspirin 81mg daily and brilinta 90mg bid, which was continued post-procedure.     Latest P2Y12 and ARU were 100 and 410 respectively.    CT Head No Cont/CTA H & N (04.21.21 @ 13:46)    IMPRESSION:    Brain CT: No acute hemorrhage, mass effect or extra-axial collections.      Neck CTA: Ultra high grade stenosis estimated approximately 99% narrowing of the proximal right internal carotid artery with decreased flow throughout the remainder of the cervical and intracranial segments.  Bilateral vertebral artery calcifications and stenosis at the level of the takeoff.  No stenosis of the left-sided circulation.    Brain CTA:    Diffusely decreased caliber of the right internal carotid artery with a decreased filling of the right MCA branches. Patent anterior communicating artery for cross-filling.    Patient was evaluated by physical and occupational therapy and deemed not to have any rehab needs.   Patient deemed neurologically stable for discharge to home.      72 yo male pmhx htn and LLE sciatica pw transient vision loss, right eye inferior altitudinal, for approximately one minute.  Painless no precipitating or allievitating factors.  Denies vertigo, headache, chest pain, SOB.    NIHSS 0  LKN 4/18  MRS 0    Hospital course includes patient undergoing conventional angiography with stent placement. Prior to angiography patient started on aspirin 81mg daily and brilinta 90mg bid, which was continued post-procedure.     Latest P2Y12 and ARU were 100 and 410 respectively.    CT Head No Cont/CTA H & N (04.21.21 @ 13:46)    IMPRESSION:    Brain CT: No acute hemorrhage, mass effect or extra-axial collections.      Neck CTA: Ultra high grade stenosis estimated approximately 99% narrowing of the proximal right internal carotid artery with decreased flow throughout the remainder of the cervical and intracranial segments.  Bilateral vertebral artery calcifications and stenosis at the level of the takeoff.  No stenosis of the left-sided circulation.    Brain CTA:    Diffusely decreased caliber of the right internal carotid artery with a decreased filling of the right MCA branches. Patent anterior communicating artery for cross-filling.    IR Neuro (04.22.21)    IMPRESSION:  1. Critical greater than 95% flow limiting symptomatic right carotid stenosis secondary to an atherosclerotic plaque associated with adaptive narrowing of the right internal carotid artery to 1.9 mm and eliminating the ability to use the distal embolic protection device.  2. Post balloon angioplasty and stent placement, there is mild 15% residual right carotid stenosis.    Transthoracic Echocardiogram (04.22.21)    Conclusions:  1. Increased relative wall thickness with normal left  ventricular mass index, consistent with concentric left  ventricular remodeling.  2. Normal left ventricular systolic function. No segmental  wall motion abnormalities.  3. Normal right ventricular size and function.    Patient underwent carotid dopplers s/p procedure which showed []    A1c 5.6,   Patient was evaluated by physical and occupational therapy and deemed not to have any rehab needs.     Patient deemed neurologically stable for discharge to home.      72 yo male pmhx htn and LLE sciatica pw transient vision loss, right eye inferior altitudinal, for approximately one minute.  Painless no precipitating or allievitating factors.  Denies vertigo, headache, chest pain, SOB.    NIHSS 0  LKN 4/18  MRS 0    Hospital course includes patient undergoing conventional angiography with stent placement. Prior to angiography patient started on aspirin 81mg daily and brilinta 90mg bid, which was continued post-procedure.     Latest P2Y12 and ARU were 100 and 410 respectively.    CT Head No Cont/CTA H & N (04.21.21 @ 13:46)    IMPRESSION:    Brain CT: No acute hemorrhage, mass effect or extra-axial collections.      Neck CTA: Ultra high grade stenosis estimated approximately 99% narrowing of the proximal right internal carotid artery with decreased flow throughout the remainder of the cervical and intracranial segments.  Bilateral vertebral artery calcifications and stenosis at the level of the takeoff.  No stenosis of the left-sided circulation.    Brain CTA:    Diffusely decreased caliber of the right internal carotid artery with a decreased filling of the right MCA branches. Patent anterior communicating artery for cross-filling.    IR Neuro (04.22.21)    IMPRESSION:  1. Critical greater than 95% flow limiting symptomatic right carotid stenosis secondary to an atherosclerotic plaque associated with adaptive narrowing of the right internal carotid artery to 1.9 mm and eliminating the ability to use the distal embolic protection device.  2. Post balloon angioplasty and stent placement, there is mild 15% residual right carotid stenosis.    Transthoracic Echocardiogram (04.22.21)    Conclusions:  1. Increased relative wall thickness with normal left  ventricular mass index, consistent with concentric left  ventricular remodeling.  2. Normal left ventricular systolic function. No segmental  wall motion abnormalities.  3. Normal right ventricular size and function.    VA Duplex Carotid, Bilat (04.23.21)    IMPRESSION: Patency of right carotid stent without evidence of residual hemodynamically significant stenosis.    Mild mixed plaque in the left carotid bulb without duplex evidence of hemodynamically significant stenosis.    Patient underwent carotid dopplers s/p procedure which showed patency of R carotid stent without evidence of hemodynamically significant stenosis.    A1c 5.6,   Patient was evaluated by physical and occupational therapy and deemed not to have any rehab needs.     Patient deemed neurologically stable for discharge to home.

## 2021-04-23 ENCOUNTER — TRANSCRIPTION ENCOUNTER (OUTPATIENT)
Age: 71
End: 2021-04-23

## 2021-04-23 VITALS
DIASTOLIC BLOOD PRESSURE: 103 MMHG | OXYGEN SATURATION: 98 % | HEART RATE: 56 BPM | RESPIRATION RATE: 13 BRPM | SYSTOLIC BLOOD PRESSURE: 121 MMHG

## 2021-04-23 LAB
ANION GAP SERPL CALC-SCNC: 8 MMOL/L — SIGNIFICANT CHANGE UP (ref 5–17)
BUN SERPL-MCNC: 20 MG/DL — SIGNIFICANT CHANGE UP (ref 7–23)
CALCIUM SERPL-MCNC: 8.5 MG/DL — SIGNIFICANT CHANGE UP (ref 8.4–10.5)
CHLORIDE SERPL-SCNC: 113 MMOL/L — HIGH (ref 96–108)
CO2 SERPL-SCNC: 19 MMOL/L — LOW (ref 22–31)
CREAT SERPL-MCNC: 0.74 MG/DL — SIGNIFICANT CHANGE UP (ref 0.5–1.3)
GLUCOSE SERPL-MCNC: 106 MG/DL — HIGH (ref 70–99)
HCT VFR BLD CALC: 37.3 % — LOW (ref 39–50)
HCT VFR BLD CALC: 41 % — SIGNIFICANT CHANGE UP (ref 39–50)
HGB BLD-MCNC: 12.3 G/DL — LOW (ref 13–17)
HGB BLD-MCNC: 13.4 G/DL — SIGNIFICANT CHANGE UP (ref 13–17)
MCHC RBC-ENTMCNC: 29.6 PG — SIGNIFICANT CHANGE UP (ref 27–34)
MCHC RBC-ENTMCNC: 29.7 PG — SIGNIFICANT CHANGE UP (ref 27–34)
MCHC RBC-ENTMCNC: 32.7 GM/DL — SIGNIFICANT CHANGE UP (ref 32–36)
MCHC RBC-ENTMCNC: 33 GM/DL — SIGNIFICANT CHANGE UP (ref 32–36)
MCV RBC AUTO: 90.1 FL — SIGNIFICANT CHANGE UP (ref 80–100)
MCV RBC AUTO: 90.5 FL — SIGNIFICANT CHANGE UP (ref 80–100)
NRBC # BLD: 0 /100 WBCS — SIGNIFICANT CHANGE UP (ref 0–0)
NRBC # BLD: 0 /100 WBCS — SIGNIFICANT CHANGE UP (ref 0–0)
PA ADP PRP-ACNC: 100 PRU — LOW (ref 194–417)
PLATELET # BLD AUTO: 221 K/UL — SIGNIFICANT CHANGE UP (ref 150–400)
PLATELET # BLD AUTO: 238 K/UL — SIGNIFICANT CHANGE UP (ref 150–400)
PLATELET RESPONSE ASPIRIN RESULT: 410 ARU — SIGNIFICANT CHANGE UP (ref 350–700)
POTASSIUM SERPL-MCNC: 4.4 MMOL/L — SIGNIFICANT CHANGE UP (ref 3.5–5.3)
POTASSIUM SERPL-SCNC: 4.4 MMOL/L — SIGNIFICANT CHANGE UP (ref 3.5–5.3)
RBC # BLD: 4.14 M/UL — LOW (ref 4.2–5.8)
RBC # BLD: 4.53 M/UL — SIGNIFICANT CHANGE UP (ref 4.2–5.8)
RBC # FLD: 15.2 % — HIGH (ref 10.3–14.5)
RBC # FLD: 15.2 % — HIGH (ref 10.3–14.5)
SODIUM SERPL-SCNC: 140 MMOL/L — SIGNIFICANT CHANGE UP (ref 135–145)
WBC # BLD: 11.6 K/UL — HIGH (ref 3.8–10.5)
WBC # BLD: 9.81 K/UL — SIGNIFICANT CHANGE UP (ref 3.8–10.5)
WBC # FLD AUTO: 11.6 K/UL — HIGH (ref 3.8–10.5)
WBC # FLD AUTO: 9.81 K/UL — SIGNIFICANT CHANGE UP (ref 3.8–10.5)

## 2021-04-23 PROCEDURE — 85730 THROMBOPLASTIN TIME PARTIAL: CPT

## 2021-04-23 PROCEDURE — C1894: CPT

## 2021-04-23 PROCEDURE — C1769: CPT

## 2021-04-23 PROCEDURE — C1725: CPT

## 2021-04-23 PROCEDURE — 70496 CT ANGIOGRAPHY HEAD: CPT

## 2021-04-23 PROCEDURE — 70450 CT HEAD/BRAIN W/O DYE: CPT

## 2021-04-23 PROCEDURE — 97161 PT EVAL LOW COMPLEX 20 MIN: CPT

## 2021-04-23 PROCEDURE — 82962 GLUCOSE BLOOD TEST: CPT

## 2021-04-23 PROCEDURE — 85027 COMPLETE CBC AUTOMATED: CPT

## 2021-04-23 PROCEDURE — 85610 PROTHROMBIN TIME: CPT

## 2021-04-23 PROCEDURE — 97165 OT EVAL LOW COMPLEX 30 MIN: CPT

## 2021-04-23 PROCEDURE — 93005 ELECTROCARDIOGRAM TRACING: CPT

## 2021-04-23 PROCEDURE — 85576 BLOOD PLATELET AGGREGATION: CPT

## 2021-04-23 PROCEDURE — 80048 BASIC METABOLIC PNL TOTAL CA: CPT

## 2021-04-23 PROCEDURE — C1874: CPT

## 2021-04-23 PROCEDURE — 37215 TRANSCATH STENT CCA W/EPS: CPT

## 2021-04-23 PROCEDURE — 85652 RBC SED RATE AUTOMATED: CPT

## 2021-04-23 PROCEDURE — 83036 HEMOGLOBIN GLYCOSYLATED A1C: CPT

## 2021-04-23 PROCEDURE — 86803 HEPATITIS C AB TEST: CPT

## 2021-04-23 PROCEDURE — C1760: CPT

## 2021-04-23 PROCEDURE — 93880 EXTRACRANIAL BILAT STUDY: CPT | Mod: 26

## 2021-04-23 PROCEDURE — U0005: CPT

## 2021-04-23 PROCEDURE — U0003: CPT

## 2021-04-23 PROCEDURE — 93306 TTE W/DOPPLER COMPLETE: CPT

## 2021-04-23 PROCEDURE — 81001 URINALYSIS AUTO W/SCOPE: CPT

## 2021-04-23 PROCEDURE — 86900 BLOOD TYPING SEROLOGIC ABO: CPT

## 2021-04-23 PROCEDURE — 99285 EMERGENCY DEPT VISIT HI MDM: CPT | Mod: 25

## 2021-04-23 PROCEDURE — 86769 SARS-COV-2 COVID-19 ANTIBODY: CPT

## 2021-04-23 PROCEDURE — C1887: CPT

## 2021-04-23 PROCEDURE — 84484 ASSAY OF TROPONIN QUANT: CPT

## 2021-04-23 PROCEDURE — 85025 COMPLETE CBC W/AUTO DIFF WBC: CPT

## 2021-04-23 PROCEDURE — 70498 CT ANGIOGRAPHY NECK: CPT

## 2021-04-23 PROCEDURE — 93880 EXTRACRANIAL BILAT STUDY: CPT

## 2021-04-23 PROCEDURE — 80053 COMPREHEN METABOLIC PANEL: CPT

## 2021-04-23 PROCEDURE — 82803 BLOOD GASES ANY COMBINATION: CPT

## 2021-04-23 PROCEDURE — 86901 BLOOD TYPING SEROLOGIC RH(D): CPT

## 2021-04-23 PROCEDURE — 86850 RBC ANTIBODY SCREEN: CPT

## 2021-04-23 PROCEDURE — 80061 LIPID PANEL: CPT

## 2021-04-23 RX ORDER — ATORVASTATIN CALCIUM 80 MG/1
1 TABLET, FILM COATED ORAL
Qty: 30 | Refills: 3
Start: 2021-04-23

## 2021-04-23 RX ORDER — TICAGRELOR 90 MG/1
1 TABLET ORAL
Qty: 60 | Refills: 3
Start: 2021-04-23

## 2021-04-23 RX ADMIN — TICAGRELOR 90 MILLIGRAM(S): 90 TABLET ORAL at 06:04

## 2021-04-23 RX ADMIN — Medication 81 MILLIGRAM(S): at 11:57

## 2021-04-23 RX ADMIN — DEXTROSE MONOHYDRATE, SODIUM CHLORIDE, AND POTASSIUM CHLORIDE 50 MILLILITER(S): 50; .745; 4.5 INJECTION, SOLUTION INTRAVENOUS at 10:16

## 2021-04-23 NOTE — DIETITIAN INITIAL EVALUATION ADULT. - OTHER INFO
Pt unsure of UBW; believes ~185-190 lbs, however does not weigh himself. Dosing wt (4/22): 180 lbs. Endorsed goal weight 175 lbs.     Pt reports the first meal he has received was this morning at breakfast (4/23). Endorsed previously NPO/limited PO intake r/t surgical procedure and recovery. Pt made aware of daily menus; reports he is be discharged today, however will utilize as able. No specific dietary preferences noted at this time. Will monitor.     No documented BM's recorded in house thus far. Not on apparent bowel regimen.     Skin: no pressure injuries  Edema: none     Pt appeared interested in heart healthy education, and appears motivated in making diet/lifestyle changes upon discharge with goal for weight loss and improvement in blood lipid levels. RD educated pt via verbal discussion/written materials on heart healthy diet education, nutrition label reading, food shopping and cooking tips, blood lipid goals. RD made pt aware of RD available daily if further questions/concerns arise.

## 2021-04-23 NOTE — SPEECH LANGUAGE PATHOLOGY EVALUATION - COMMENTS
IMAGIN/21 Brain CT: No acute hemorrhage, mass effect or extra-axial collections.       Neck CTA: Ultra high grade stenosis estimated approximately 99% narrowing of the proximal right internal carotid artery with decreased flow throughout the remainder of the cervical and intracranial segments. Bilateral vertebral artery calcifications and stenosis at the level of the takeoff. No stenosis of the left-sided circulation.     Brain CTA: Diffusely decreased caliber of the right internal carotid artery with a decreased filling of the right MCA branches. Patent anterior communicating artery for cross-filling.   IR Neuro: 1. Critical greater than 95% flow limiting symptomatic right carotid stenosis secondary to an atherosclerotic plaque associated with adaptive narrowing of the right internal carotid artery to 1.9 mm and eliminating the ability to use the distal embolic protection device. 2. Post balloon angioplasty and stent placement, there is mild 15% residual right carotid stenosis.
Neurological exam: AOx3, no aphasia, no dysarthria

## 2021-04-23 NOTE — SPEECH LANGUAGE PATHOLOGY EVALUATION - SLP PERTINENT HISTORY OF CURRENT PROBLEM
70 yo male pmhx htn and LLE sciatica pw transient vision loss, right eye inferior altitudinal, for approximately one minute.  Painless no precipitating or allievitating factors.  Denies vertigo, headache, chest pain, SOB.  CTA demonstrates proximal R ICA 99% stenosis.  Exam non-focal.  Impression: likely TIA. 4/21: Pt for angio.
70 yo male pmhx htn and LLE sciatica pw transient vision loss, right eye inferior altitudinal, for approximately one minute.  Painless no precipitating or allievitating factors.  Denies vertigo, headache, chest pain, SOB. CTA demonstrates proximal R ICA 99% stenosis.  Exam non-focal.  Impression: likely TIA. 4/22: Now s/p carotid stent placement, now off pressor support. Neurologically without acute changes noted. A&Ox3, follows commands, no dysarthria.

## 2021-04-23 NOTE — DIETITIAN INITIAL EVALUATION ADULT. - CHIEF COMPLAINT
The patient is a 72 yo M with PMH: HTN, LLE sciatica. Presented with transient vision loss, right eye inferior altitudinal. Found to have high grade stenosis estimated approximately 99% narrowing of the proximal right internal carotid artery with decreased flow throughout the remainder of the cervical and intracranial segments on CTA. S/P carotid stent 4/22.

## 2021-04-23 NOTE — DIETITIAN INITIAL EVALUATION ADULT. - ORAL INTAKE PTA/DIET HISTORY
Pt reports very good appetite PTA. Brief diet recall reveals diet high in carbohydrates and saturated fats. Pt expressed hospital course was eye opening in term of need of diet and lifestyle modifications once discharged; acknowledged elevated cholesterol level and reports "I need to eat more salads and salmon"... "I need to exercise". Pt denies intolerance to chewing/swallowing, denies N/V/C/D. Denies food allergies. Reports taking several vitamins, including (but not limited to) vitamin E & D.

## 2021-04-23 NOTE — PHYSICAL THERAPY INITIAL EVALUATION ADULT - PERTINENT HX OF CURRENT PROBLEM, REHAB EVAL
Pt is a 71 yoM pmhx htn and LLE sciatica pw transient vision loss, right eye inferior altitudinal, for approximately one minute, found to have  Ultra high grade stenosis estimated approximately 99% narrowing of the proximal right internal carotid artery with decreased flow throughout the remainder of the cervical and intracranial segments on CTA; scheduled for carotid stent on 4/22

## 2021-04-23 NOTE — DIETITIAN INITIAL EVALUATION ADULT. - ADD RECOMMEND
1) Continue DASH/TLC diet; consider d/c consistent carbohydrate. Defer consistency to medical team, SLP. 2) Monitor wt trends, nutrition related labs, skin integrity, bowel regularity. 3) Diet education PRN/as per request of pt and/or medical team. RD to remain available.

## 2021-04-23 NOTE — PROGRESS NOTE ADULT - ASSESSMENT
ASSESSMENT: 70 yo male pmhx htn and LLE sciatica presented with  transient vision loss, right eye inferior altitudinal, for approximately one minute.   CTA demonstrated proximal Right  ICA 99% stenosis, he underwent balloon angioplasty and stent placement for symptomatic critical right internal carotid artery atherosclerotic stenosis with adaptive narrowing and significant flow limitation. Mild residual right carotid stenosis as noted.     NEURO: neurologically without acute change, per DR. Wise -140mmHg with overall goal of normotension, titrate statin to LDL goal less than 70. Physical therapy/OT not needed at this time as patient at independent functional baseline.     ANTITHROMBOTIC THERAPY: DAPT with ASA/Brilinta, duration per Dr. Wise, P2Y12 100    PULMONARY:  protecting airway, saturating well     CARDIOVASCULAR:  TTE:ef 75%, mild stage 1 diastolic dysfunction, cardiac monitoring with sinus bradycardia intermittent, not reported associated sx.                               SBP goal: as noted , overall normotension     GASTROINTESTINAL:  dysphagia screen passed, tolerating diet       Diet: regular     RENAL: BUN/Cr without acute change, maintain adequate hydration, good urine output      Na Goal: Greater than 135     [place]: n     HEMATOLOGY: H/H with slight downtrend, no active bleeding , Platelets 221, repeat CBC pending     DVT ppx: Heparin s.c [] LMWH [x]     ID: afebrile, no leukocytosis     OTHER: condition and plan of care d/w patient, questions and concerns addressed. Additionally adressed post angiogram wound care and including but not limited to return to ED for changes in procedure site (bleeding, swelling, etc) and leg.     DISPOSITION: Home.       CORE MEASURES:        Admission   NIHSS: 0     TPA: [] YES [x] NO      LDL/HDL: 172/53     Depression Screen: 0     Statin Therapy: y      Dysphagia Screen: [x] PASS [] FAIL     Smoking [] YES [x] NO  quit 10 years ago, former x 30 years      Afib [] YES [x] NO     Stroke Education [x] YES [] NO    Obtain screening lower extremity venous ultrasound in patients who meet 1 or more of the following criteria as patient is high risk for DVT/PE on admission:   [] History of DVT/PE  []Hypercoagulable states (Factor V Leiden, Cancer, OCP, etc. )  []Prolonged immobility (hemiplegia/hemiparesis/post operative or any other extended immobilization)  [] Transferred from outside facility (Rehab or Long term care)  [] Age </= to 50

## 2021-04-23 NOTE — PHYSICAL THERAPY INITIAL EVALUATION ADULT - ADDITIONAL COMMENTS
PTA pt was independent with functional mobility and ADLs pt lives alone in PH 1 flight of steps inside

## 2021-04-23 NOTE — SPEECH LANGUAGE PATHOLOGY EVALUATION - SLP DIAGNOSIS
Order for speech/language evaluation received and chart reviewed. Attempted to see patient however off the unit at this time. Will follow-up as schedule allows.
Attempted to see pt for speech-language evaluation. Chart reviewed. Pt discharged prior to evaluation, therefore, unable to be completed.

## 2021-04-23 NOTE — DISCHARGE NOTE NURSING/CASE MANAGEMENT/SOCIAL WORK - PATIENT PORTAL LINK FT
You can access the FollowMyHealth Patient Portal offered by [facility] by registering at the following website: [email or web address]. By joining [facility]’s FollowMyHealth portal, you will also be able to view your health information using other applications (apps) compatible with our system.

## 2021-04-23 NOTE — PROGRESS NOTE ADULT - SUBJECTIVE AND OBJECTIVE BOX
THE PATIENT WAS SEEN AND EXAMINED BY ME WITH THE HOUSESTAFF AND STROKE TEAM DURING MORNING ROUNDS.   HPI:  72 yo male pmhx htn and LLE sciatica pw transient vision loss, right eye inferior altitudinal, for approximately one minute.  Painless no precipitating or alleviating factors.  Denied vertigo, headache, chest pain, SOB. NIHSS 0 LKN 4/18 MRS 0, admitted for further evaluation and IR/ intervention      SUBJECTIVE: No events overnight.  No new neurologic complaints.  ROS reported negative unless otherwise noted.    aspirin  chewable 81 milliGRAM(s) Oral daily  atorvastatin 80 milliGRAM(s) Oral at bedtime  enoxaparin Injectable 40 milliGRAM(s) SubCutaneous daily  sodium chloride 0.9% with potassium chloride 20 mEq/L 1000 milliLiter(s) IV Continuous <Continuous>  ticagrelor 90 milliGRAM(s) Oral two times a day      PHYSICAL EXAM:   Vital Signs Last 24 Hrs  T(C): 36.4 (23 Apr 2021 07:51), Max: 36.9 (22 Apr 2021 13:10)  T(F): 97.5 (23 Apr 2021 07:51), Max: 98.5 (22 Apr 2021 13:10)  HR: 54 (23 Apr 2021 10:00) (48 - 80)  BP: 123/65 (23 Apr 2021 10:00) (93/54 - 145/63)  BP(mean): 95 (23 Apr 2021 10:00) (68 - 95)  RR: 18 (23 Apr 2021 10:00) (12 - 23)  SpO2: 96% (23 Apr 2021 10:00) (91% - 97%)    General: No acute distress  HEENT: EOM intact, visual fields full  Abdomen: Soft, nontender, nondistended   Extremities: No edema, right groin site with no active bleeding, no hematoma, soft and non tender  throughout, no active bleeding ,dressing clean and dry, extremities warm with no pallor or cyanosis throughout bilaterally 2+ pedal pulses     NEUROLOGICAL EXAM:  Mental status: Awake, alert, oriented x3, no aphasia, no neglect, normal memory   Cranial Nerves: No facial asymmetry, no nystagmus, no dysarthria,  tongue midline  Motor exam: Normal tone, no drift, 5/5 RUE, 5/5 RLE, 5/5 LUE, 5/5 LLE, normal fine finger movements.  Sensation: Intact to light touch   Coordination/ Gait: No dysmetria, LAURA intact and symmetric bilaterally    LABS:                        12.3   9.81  )-----------( 221      ( 23 Apr 2021 06:20 )             37.3    04-23    140  |  113<H>  |  20  ----------------------------<  106<H>  4.4   |  19<L>  |  0.74    Ca    8.5      23 Apr 2021 06:20    PT/INR - ( 22 Apr 2021 05:58 )   PT: 13.4 sec;   INR: 1.12 ratio         PTT - ( 22 Apr 2021 05:58 )  PTT:33.8 sec      IMAGING: Reviewed by me.     04.21.21  Brain CT: No acute hemorrhage, mass effect or extra-axial collections.  Neck CTA: Ultra high grade stenosis estimated approximately 99% narrowing of the proximal right internal carotid artery with decreased flow throughout the remainder of the cervical and intracranial segments.  Bilateral vertebral artery calcifications and stenosis at the level of the takeoff.  No stenosis of the left-sided circulation.  Brain CTA:  Diffusely decreased caliber of the right internal carotid artery with a decreased filling of the right MCA branches. Patent anterior communicating artery for cross-filling.    IR Neuro (04.22.21)  1. Critical greater than 95% flow limiting symptomatic right carotid stenosis secondary to an atherosclerotic plaque associated with adaptive narrowing of the right internal carotid artery to 1.9 mm and eliminating the ability to use the distal embolic protection device.  2. Post balloon angioplasty and stent placement, there is mild 15% residual right carotid stenosis.    VA Duplex Carotid, Bilat (04.23.21)  Patency of right carotid stent without evidence of residual hemodynamically significant stenosis.  Mild mixed plaque in the left carotid bulb without duplex evidence of hemodynamically significant stenosis.

## 2021-04-23 NOTE — DISCHARGE NOTE NURSING/CASE MANAGEMENT/SOCIAL WORK - NSDCPEPTSTROKESIGNS_GEN_ALL_CORE
Sudden numbness or weakness of the face, arm, or leg, especially on one side of the body. Confusion, trouble speaking or understanding. Trouble seeing in one or both eyes. Trouble walking, dizziness, loss of balance or coordination. Severe headache. Melolabial Transposition Flap Text: The defect edges were debeveled with a #15 scalpel blade.  Given the location of the defect and the proximity to free margins a melolabial flap was deemed most appropriate.  Using a sterile surgical marker, an appropriate melolabial transposition flap was drawn incorporating the defect.    The area thus outlined was incised deep to adipose tissue with a #15 scalpel blade.  The skin margins were undermined to an appropriate distance in all directions utilizing iris scissors.

## 2021-04-27 PROBLEM — I10 ESSENTIAL (PRIMARY) HYPERTENSION: Chronic | Status: ACTIVE | Noted: 2021-04-21

## 2021-04-29 ENCOUNTER — NON-APPOINTMENT (OUTPATIENT)
Age: 71
End: 2021-04-29

## 2021-05-04 ENCOUNTER — APPOINTMENT (OUTPATIENT)
Dept: NEUROLOGY | Facility: CLINIC | Age: 71
End: 2021-05-04
Payer: MEDICARE

## 2021-05-04 VITALS
RESPIRATION RATE: 17 BRPM | BODY MASS INDEX: 25.77 KG/M2 | SYSTOLIC BLOOD PRESSURE: 151 MMHG | DIASTOLIC BLOOD PRESSURE: 67 MMHG | OXYGEN SATURATION: 97 % | TEMPERATURE: 98.3 F | HEIGHT: 70 IN | WEIGHT: 180 LBS | HEART RATE: 86 BPM

## 2021-05-04 DIAGNOSIS — Z78.9 OTHER SPECIFIED HEALTH STATUS: ICD-10-CM

## 2021-05-04 DIAGNOSIS — Z86.79 PERSONAL HISTORY OF OTHER DISEASES OF THE CIRCULATORY SYSTEM: ICD-10-CM

## 2021-05-04 DIAGNOSIS — Z82.3 FAMILY HISTORY OF STROKE: ICD-10-CM

## 2021-05-04 DIAGNOSIS — Z87.19 PERSONAL HISTORY OF OTHER DISEASES OF THE DIGESTIVE SYSTEM: ICD-10-CM

## 2021-05-04 PROCEDURE — 99024 POSTOP FOLLOW-UP VISIT: CPT

## 2021-05-04 NOTE — DISCUSSION/SUMMARY
improved [FreeTextEntry1] : Mr. Johansen is a 71 year-old man with PMH HTN who presented to [facility] on 4/21/21 with transient monocular blindness of the right eye. He was found to have 99% stenosis of the proximal right ICA and is now almost two weeks s/p right carotid stent placement. He is doing well and has no complaints. Continue Brilinta and ASA. Perform bilateral carotid duplex in six months to assess for intimal hyperplasia. Follow-up with me in six months. All of his questions and concerns were addressed.

## 2021-05-04 NOTE — PHYSICAL EXAM
[General Appearance - Alert] : alert [General Appearance - In No Acute Distress] : in no acute distress [Oriented To Time, Place, And Person] : oriented to person, place, and time [Impaired Insight] : insight and judgment were intact [Person] : oriented to person [Place] : oriented to place [Time] : oriented to time [Short Term Intact] : short term memory intact [Span Intact] : the attention span was normal [Concentration Intact] : normal concentrating ability [Visual Intact] : visual attention was ~T not ~L decreased [Naming Objects] : no difficulty naming common objects [Repeating Phrases] : no difficulty repeating a phrase [Fluency] : fluency intact [Cranial Nerves Optic (II)] : visual acuity intact bilaterally,  visual fields full to confrontation, pupils equal round and reactive to light [Cranial Nerves Oculomotor (III)] : extraocular motion intact [Cranial Nerves Trigeminal (V)] : facial sensation intact symmetrically [Cranial Nerves Facial (VII)] : face symmetrical [Cranial Nerves Vestibulocochlear (VIII)] : hearing was intact bilaterally [Cranial Nerves Glossopharyngeal (IX)] : tongue and palate midline [Cranial Nerves Accessory (XI - Cranial And Spinal)] : head turning and shoulder shrug symmetric [Cranial Nerves Hypoglossal (XII)] : there was no tongue deviation with protrusion [Motor Tone] : muscle tone was normal in all four extremities [Motor Strength] : muscle strength was normal in all four extremities [Sensation Tactile Decrease] : light touch was intact [Sclera] : the sclera and conjunctiva were normal [PERRL With Normal Accommodation] : pupils were equal in size, round, reactive to light, with normal accommodation [Extraocular Movements] : extraocular movements were intact [Full Visual Field] : full visual field [Outer Ear] : the ears and nose were normal in appearance [Hearing Threshold Finger Rub Not [place]] : hearing was normal [Neck Appearance] : the appearance of the neck was normal [] : no respiratory distress [Respiration, Rhythm And Depth] : normal respiratory rhythm and effort [Heart Rate And Rhythm] : heart rate was normal and rhythm regular [Edema] : there was no peripheral edema [Abnormal Walk] : normal gait [Skin Color & Pigmentation] : normal skin color and pigmentation [Paresis Pronator Drift Right-Sided] : no pronator drift on the right [Paresis Pronator Drift Left-Sided] : no pronator drift on the left [Motor Strength Upper Extremities Bilaterally] : strength was normal in both upper extremities [Motor Strength Lower Extremities Bilaterally] : strength was normal in both lower extremities [Limited Balance] : balance was intact [Past-pointing] : there was no past-pointing [Tremor] : no tremor present [Dysdiadochokinesia Bilaterally] : not present [Coordination - Dysmetria Impaired Finger-to-Nose Bilateral] : not present [FreeTextEntry1] : Groin site dry and intact.

## 2021-05-04 NOTE — HISTORY OF PRESENT ILLNESS
[FreeTextEntry1] : Mr. Johansen is a 71 year-old man with PMH who presented to the ED at [facility] on 4/21/21, with c/o transient vision loss in the right eye inferior altitudinal for approximately one minute. He was found to have 99% stenosis of the proximal right ICA and had a cerebral angiogram with stent placement performed on 4/22/21. Post op bilateral carotid duplex showed patency of right carotid stent without evidence of residual hemodynamically significant stenosis. He presents to the office today for post hospitalization evaluation. Since discharge he reports doing well since discharge. Groin site has healed and is without drainage or ecchymosis. He denies any new TIA or stroke-like symptoms.

## 2021-05-04 NOTE — REVIEW OF SYSTEMS
[As Noted in HPI] : as noted in HPI [Fever] : no fever [Chills] : no chills [Anxiety] : no anxiety [Depression] : no depression [Confused or Disoriented] : no confusion [Memory Lapses or Loss] : no memory loss [Decr. Concentrating Ability] : no decrease in concentrating ability [Difficulty with Language] : no ~M difficulty with language [Changed Thought Patterns] : no change in thought patterns [Facial Weakness] : no facial weakness [Arm Weakness] : no arm weakness [Hand Weakness] : no hand weakness [Leg Weakness] : no leg weakness [Numbness] : no numbness [Tingling] : no tingling [Seizures] : no convulsions [Dizziness] : no dizziness [Difficulty Walking] : no difficulty walking [Inability to Walk] : able to walk [Eyesight Problems] : no eyesight problems [Loss Of Hearing] : no hearing loss [Chest Pain] : no chest pain [Palpitations] : no palpitations [Cough] : no cough [SOB on Exertion] : no shortness of breath during exertion [Constipation] : no constipation [Diarrhea] : no diarrhea

## 2021-05-04 NOTE — CONSULT LETTER
[Dear  ___] : Dear  [unfilled], [Consult Letter:] : I had the pleasure of evaluating your patient, [unfilled]. [Please see my note below.] : Please see my note below. [Consult Closing:] : Thank you very much for allowing me to participate in the care of this patient.  If you have any questions, please do not hesitate to contact me. [Sincerely,] : Sincerely, [FreeTextEntry3] : Clemente Wise MD\par Chief, Vascular Neurology and Neurology Service , NeuroEndovascular Surgery\par  of Neurology and Radiology\par [facility] [employer] [facility]\par Director, Comprehensive Stroke Center and Stroke Unit\par [facility]\par Director, NeuroEndovascular Surgery\par [facility]

## 2021-10-12 ENCOUNTER — APPOINTMENT (OUTPATIENT)
Dept: NEUROLOGY | Facility: CLINIC | Age: 71
End: 2021-10-12
Payer: MEDICARE

## 2021-10-12 VITALS
SYSTOLIC BLOOD PRESSURE: 155 MMHG | WEIGHT: 178 LBS | DIASTOLIC BLOOD PRESSURE: 85 MMHG | BODY MASS INDEX: 25.48 KG/M2 | HEIGHT: 70 IN | HEART RATE: 75 BPM

## 2021-10-12 PROCEDURE — 93886 INTRACRANIAL COMPLETE STUDY: CPT

## 2021-10-12 PROCEDURE — 93892 TCD EMBOLI DETECT W/O INJ: CPT

## 2021-10-12 PROCEDURE — 99215 OFFICE O/P EST HI 40 MIN: CPT

## 2021-10-12 PROCEDURE — 93880 EXTRACRANIAL BILAT STUDY: CPT

## 2021-10-12 NOTE — DISCUSSION/SUMMARY
[FreeTextEntry1] : Mr. Johansen is a 71 year-old man with PMH HTN now 6 months s/o right carotid stent placement for symptomatic severe right carotid stenosis. Repeat carotid Doppler today showed a patient right carotid stent and an incidental thyroid nodule for which he will follow with PCP.  He will stop the Brilinta and continue the ASA. Plan for repeat carotid Doppler in 1  year, 10/22.  All of his questions and concerns were addressed.

## 2021-10-12 NOTE — REVIEW OF SYSTEMS
[Fever] : no fever [Chills] : no chills [Feeling Poorly] : not feeling poorly [Feeling Tired] : not feeling tired [Suicidal] : not suicidal [Anxiety] : no anxiety [Depression] : no depression [Confused or Disoriented] : no confusion [Memory Lapses or Loss] : no memory loss [Decr. Concentrating Ability] : no decrease in concentrating ability [Difficulty with Language] : no ~M difficulty with language [Changed Thought Patterns] : no change in thought patterns [Repeating Questions] : no repeated questioning about recent events [Facial Weakness] : no facial weakness [Arm Weakness] : no arm weakness [Hand Weakness] : no hand weakness [Leg Weakness] : no leg weakness [Poor Coordination] : good coordination [Difficulty Writing] : no difficulty writing [Difficulties in Speech] : no speech difficulties [Numbness] : no numbness [Tingling] : no tingling [Seizures] : no convulsions [Dizziness] : no dizziness [Fainting] : no fainting [Lightheadedness] : no lightheadedness [Vertigo] : no vertigo [Tension Headache] : no tension-type headache [Difficulty Walking] : no difficulty walking [Inability to Walk] : able to walk [Ataxia] : no ataxia [Eyesight Problems] : no eyesight problems [Loss Of Hearing] : no hearing loss [Chest Pain] : no chest pain [Shortness Of Breath] : no shortness of breath [Wheezing] : no wheezing [Incontinence] : no incontinence [Joint Pain] : no joint pain [Easy Bleeding] : no tendency for easy bleeding [Easy Bruising] : no tendency for easy bruising

## 2021-10-12 NOTE — HISTORY OF PRESENT ILLNESS
[FreeTextEntry1] : Mr. Johansen is a 71 year-old man with PMH who presented to the ED at [facility] on 4/21/21, with c/o transient vision loss in the right eye inferior altitudinal for approximately one minute. He was found to have 99% stenosis of the proximal right ICA and had a cerebral angiogram with stent placement performed on 4/22/21. Post op bilateral carotid duplex showed patency of right carotid stent without evidence of residual hemodynamically significant stenosis. He comes in today for a follow up visit. He had repeat carotid Doppler today which showed a patent right carotid stent and an incidental thyroid nodule. He is doing well,denies any new TIA or stroke-like symptoms.

## 2021-10-29 NOTE — OCCUPATIONAL THERAPY INITIAL EVALUATION ADULT - STANDING BALANCE: STATIC
normal balance Tremfya Counseling: I discussed with the patient the risks of guselkumab including but not limited to immunosuppression, serious infections, and drug reactions.  The patient understands that monitoring is required including a PPD at baseline and must alert us or the primary physician if symptoms of infection or other concerning signs are noted.

## 2022-10-11 ENCOUNTER — APPOINTMENT (OUTPATIENT)
Dept: NEUROLOGY | Facility: CLINIC | Age: 72
End: 2022-10-11

## 2022-11-22 ENCOUNTER — APPOINTMENT (OUTPATIENT)
Dept: NEUROLOGY | Facility: CLINIC | Age: 72
End: 2022-11-22

## 2024-02-09 NOTE — PHYSICAL EXAM
[General Appearance - Alert] : alert [General Appearance - Well Nourished] : well nourished [General Appearance - Well Developed] : well developed [Oriented To Time, Place, And Person] : oriented to person, place, and time [Affect] : the affect was normal [Mood] : the mood was normal [Person] : oriented to person [Place] : oriented to place [Time] : oriented to time [Concentration Intact] : normal concentrating ability [Visual Intact] : visual attention was ~T not ~L decreased [Naming Objects] : no difficulty naming common objects [Repeating Phrases] : no difficulty repeating a phrase [Writing A Sentence] : no difficulty writing a sentence [Fluency] : fluency intact [Comprehension] : comprehension intact [Reading] : reading intact [Past History] : adequate knowledge of personal past history [Cranial Nerves Optic (II)] : visual acuity intact bilaterally,  visual fields full to confrontation, pupils equal round and reactive to light [Cranial Nerves Oculomotor (III)] : extraocular motion intact [Cranial Nerves Trigeminal (V)] : facial sensation intact symmetrically [Cranial Nerves Facial (VII)] : face symmetrical [Cranial Nerves Vestibulocochlear (VIII)] : hearing was intact bilaterally [Cranial Nerves Glossopharyngeal (IX)] : tongue and palate midline [Cranial Nerves Accessory (XI - Cranial And Spinal)] : head turning and shoulder shrug symmetric [Cranial Nerves Hypoglossal (XII)] : there was no tongue deviation with protrusion [Motor Tone] : muscle tone was normal in all four extremities [Motor Strength] : muscle strength was normal in all four extremities [No Muscle Atrophy] : normal bulk in all four extremities [Sensation Tactile Decrease] : light touch was intact [Balance] : balance was intact [Full Visual Field] : full visual field [Hearing Threshold Finger Rub Not [place]] : hearing was normal [Neck Appearance] : the appearance of the neck was normal [] : no respiratory distress 175.26 [Heart Rate And Rhythm] : heart rate was normal and rhythm regular [Edema] : there was no peripheral edema [Abdomen Soft] : soft [Abnormal Walk] : normal gait [Dysdiadochokinesia Bilaterally] : not present [Coordination - Dysmetria Impaired Finger-to-Nose Bilateral] : not present

## 2024-04-22 ENCOUNTER — TRANSCRIPTION ENCOUNTER (OUTPATIENT)
Age: 74
End: 2024-04-22

## 2024-04-22 ENCOUNTER — INPATIENT (INPATIENT)
Facility: HOSPITAL | Age: 74
LOS: 0 days | Discharge: HOME | DRG: 313 | End: 2024-04-23
Attending: STUDENT IN AN ORGANIZED HEALTH CARE EDUCATION/TRAINING PROGRAM | Admitting: STUDENT IN AN ORGANIZED HEALTH CARE EDUCATION/TRAINING PROGRAM
Payer: MEDICARE

## 2024-04-22 ENCOUNTER — RESULT REVIEW (OUTPATIENT)
Age: 74
End: 2024-04-22

## 2024-04-22 VITALS
RESPIRATION RATE: 19 BRPM | HEART RATE: 87 BPM | WEIGHT: 175.05 LBS | OXYGEN SATURATION: 98 % | DIASTOLIC BLOOD PRESSURE: 88 MMHG | TEMPERATURE: 98 F | HEIGHT: 70 IN | SYSTOLIC BLOOD PRESSURE: 185 MMHG

## 2024-04-22 DIAGNOSIS — Z29.9 ENCOUNTER FOR PROPHYLACTIC MEASURES, UNSPECIFIED: ICD-10-CM

## 2024-04-22 DIAGNOSIS — Z98.890 OTHER SPECIFIED POSTPROCEDURAL STATES: Chronic | ICD-10-CM

## 2024-04-22 DIAGNOSIS — R07.9 CHEST PAIN, UNSPECIFIED: ICD-10-CM

## 2024-04-22 DIAGNOSIS — Z86.79 PERSONAL HISTORY OF OTHER DISEASES OF THE CIRCULATORY SYSTEM: ICD-10-CM

## 2024-04-22 DIAGNOSIS — I10 ESSENTIAL (PRIMARY) HYPERTENSION: ICD-10-CM

## 2024-04-22 LAB
ALBUMIN SERPL ELPH-MCNC: 4.1 G/DL — SIGNIFICANT CHANGE UP (ref 3.3–5)
ALP SERPL-CCNC: 85 U/L — SIGNIFICANT CHANGE UP (ref 40–120)
ALT FLD-CCNC: 17 U/L — SIGNIFICANT CHANGE UP (ref 10–45)
ANION GAP SERPL CALC-SCNC: 12 MMOL/L — SIGNIFICANT CHANGE UP (ref 5–17)
AST SERPL-CCNC: 27 U/L — SIGNIFICANT CHANGE UP (ref 10–40)
BASOPHILS # BLD AUTO: 0.04 K/UL — SIGNIFICANT CHANGE UP (ref 0–0.2)
BASOPHILS NFR BLD AUTO: 0.4 % — SIGNIFICANT CHANGE UP (ref 0–2)
BILIRUB SERPL-MCNC: 0.5 MG/DL — SIGNIFICANT CHANGE UP (ref 0.2–1.2)
BUN SERPL-MCNC: 16 MG/DL — SIGNIFICANT CHANGE UP (ref 7–23)
CALCIUM SERPL-MCNC: 9.3 MG/DL — SIGNIFICANT CHANGE UP (ref 8.4–10.5)
CHLORIDE SERPL-SCNC: 107 MMOL/L — SIGNIFICANT CHANGE UP (ref 96–108)
CO2 SERPL-SCNC: 20 MMOL/L — LOW (ref 22–31)
CREAT SERPL-MCNC: 0.72 MG/DL — SIGNIFICANT CHANGE UP (ref 0.5–1.3)
EGFR: 95 ML/MIN/1.73M2 — SIGNIFICANT CHANGE UP
EOSINOPHIL # BLD AUTO: 0.14 K/UL — SIGNIFICANT CHANGE UP (ref 0–0.5)
EOSINOPHIL NFR BLD AUTO: 1.4 % — SIGNIFICANT CHANGE UP (ref 0–6)
GLUCOSE SERPL-MCNC: 118 MG/DL — HIGH (ref 70–99)
HCT VFR BLD CALC: 44.2 % — SIGNIFICANT CHANGE UP (ref 39–50)
HGB BLD-MCNC: 14.4 G/DL — SIGNIFICANT CHANGE UP (ref 13–17)
IMM GRANULOCYTES NFR BLD AUTO: 0.3 % — SIGNIFICANT CHANGE UP (ref 0–0.9)
LYMPHOCYTES # BLD AUTO: 1.05 K/UL — SIGNIFICANT CHANGE UP (ref 1–3.3)
LYMPHOCYTES # BLD AUTO: 10.9 % — LOW (ref 13–44)
MCHC RBC-ENTMCNC: 29.6 PG — SIGNIFICANT CHANGE UP (ref 27–34)
MCHC RBC-ENTMCNC: 32.6 GM/DL — SIGNIFICANT CHANGE UP (ref 32–36)
MCV RBC AUTO: 90.8 FL — SIGNIFICANT CHANGE UP (ref 80–100)
MONOCYTES # BLD AUTO: 0.6 K/UL — SIGNIFICANT CHANGE UP (ref 0–0.9)
MONOCYTES NFR BLD AUTO: 6.2 % — SIGNIFICANT CHANGE UP (ref 2–14)
NEUTROPHILS # BLD AUTO: 7.81 K/UL — HIGH (ref 1.8–7.4)
NEUTROPHILS NFR BLD AUTO: 80.8 % — HIGH (ref 43–77)
NRBC # BLD: 0 /100 WBCS — SIGNIFICANT CHANGE UP (ref 0–0)
PLATELET # BLD AUTO: 216 K/UL — SIGNIFICANT CHANGE UP (ref 150–400)
POTASSIUM SERPL-MCNC: 4.2 MMOL/L — SIGNIFICANT CHANGE UP (ref 3.5–5.3)
POTASSIUM SERPL-SCNC: 4.2 MMOL/L — SIGNIFICANT CHANGE UP (ref 3.5–5.3)
PROT SERPL-MCNC: 7.5 G/DL — SIGNIFICANT CHANGE UP (ref 6–8.3)
RBC # BLD: 4.87 M/UL — SIGNIFICANT CHANGE UP (ref 4.2–5.8)
RBC # FLD: 14.7 % — HIGH (ref 10.3–14.5)
SODIUM SERPL-SCNC: 139 MMOL/L — SIGNIFICANT CHANGE UP (ref 135–145)
TROPONIN T, HIGH SENSITIVITY RESULT: 10 NG/L — SIGNIFICANT CHANGE UP (ref 0–51)
TROPONIN T, HIGH SENSITIVITY RESULT: 11 NG/L — SIGNIFICANT CHANGE UP (ref 0–51)
WBC # BLD: 9.67 K/UL — SIGNIFICANT CHANGE UP (ref 3.8–10.5)
WBC # FLD AUTO: 9.67 K/UL — SIGNIFICANT CHANGE UP (ref 3.8–10.5)

## 2024-04-22 PROCEDURE — 93306 TTE W/DOPPLER COMPLETE: CPT | Mod: 26

## 2024-04-22 PROCEDURE — 99285 EMERGENCY DEPT VISIT HI MDM: CPT | Mod: GC

## 2024-04-22 PROCEDURE — 99223 1ST HOSP IP/OBS HIGH 75: CPT | Mod: 25

## 2024-04-22 PROCEDURE — 78452 HT MUSCLE IMAGE SPECT MULT: CPT | Mod: 26

## 2024-04-22 PROCEDURE — 93018 CV STRESS TEST I&R ONLY: CPT

## 2024-04-22 PROCEDURE — 93356 MYOCRD STRAIN IMG SPCKL TRCK: CPT

## 2024-04-22 PROCEDURE — 93016 CV STRESS TEST SUPVJ ONLY: CPT

## 2024-04-22 PROCEDURE — 71046 X-RAY EXAM CHEST 2 VIEWS: CPT | Mod: 26

## 2024-04-22 PROCEDURE — 99223 1ST HOSP IP/OBS HIGH 75: CPT

## 2024-04-22 RX ORDER — ASPIRIN/CALCIUM CARB/MAGNESIUM 324 MG
1 TABLET ORAL
Qty: 0 | Refills: 0 | DISCHARGE

## 2024-04-22 RX ORDER — ATORVASTATIN CALCIUM 80 MG/1
1 TABLET, FILM COATED ORAL
Refills: 0 | DISCHARGE

## 2024-04-22 RX ORDER — INFLUENZA VIRUS VACCINE 15; 15; 15; 15 UG/.5ML; UG/.5ML; UG/.5ML; UG/.5ML
0.7 SUSPENSION INTRAMUSCULAR ONCE
Refills: 0 | Status: DISCONTINUED | OUTPATIENT
Start: 2024-04-22 | End: 2024-04-23

## 2024-04-22 RX ORDER — ATORVASTATIN CALCIUM 80 MG/1
20 TABLET, FILM COATED ORAL AT BEDTIME
Refills: 0 | Status: DISCONTINUED | OUTPATIENT
Start: 2024-04-22 | End: 2024-04-23

## 2024-04-22 RX ORDER — LIDOCAINE 4 G/100G
1 CREAM TOPICAL ONCE
Refills: 0 | Status: COMPLETED | OUTPATIENT
Start: 2024-04-22 | End: 2024-04-22

## 2024-04-22 RX ORDER — LANOLIN ALCOHOL/MO/W.PET/CERES
3 CREAM (GRAM) TOPICAL AT BEDTIME
Refills: 0 | Status: DISCONTINUED | OUTPATIENT
Start: 2024-04-22 | End: 2024-04-23

## 2024-04-22 RX ORDER — AMLODIPINE BESYLATE 2.5 MG/1
1 TABLET ORAL
Qty: 0 | Refills: 0 | DISCHARGE

## 2024-04-22 RX ORDER — ACETAMINOPHEN 500 MG
650 TABLET ORAL ONCE
Refills: 0 | Status: COMPLETED | OUTPATIENT
Start: 2024-04-22 | End: 2024-04-22

## 2024-04-22 RX ORDER — AMLODIPINE BESYLATE 2.5 MG/1
10 TABLET ORAL DAILY
Refills: 0 | Status: DISCONTINUED | OUTPATIENT
Start: 2024-04-22 | End: 2024-04-23

## 2024-04-22 RX ORDER — LOSARTAN POTASSIUM 100 MG/1
1 TABLET, FILM COATED ORAL
Refills: 0 | DISCHARGE

## 2024-04-22 RX ORDER — LOSARTAN POTASSIUM 100 MG/1
1 TABLET, FILM COATED ORAL
Qty: 0 | Refills: 0 | DISCHARGE

## 2024-04-22 RX ORDER — ASPIRIN/CALCIUM CARB/MAGNESIUM 324 MG
81 TABLET ORAL DAILY
Refills: 0 | Status: DISCONTINUED | OUTPATIENT
Start: 2024-04-22 | End: 2024-04-23

## 2024-04-22 RX ADMIN — Medication 650 MILLIGRAM(S): at 06:35

## 2024-04-22 RX ADMIN — Medication 650 MILLIGRAM(S): at 06:45

## 2024-04-22 NOTE — ED ADULT NURSE REASSESSMENT NOTE - NS ED NURSE REASSESS COMMENT FT1
Pt returned nuclear stress test at approx. 1700 hrs. Delay in VS due to pt not on unit. Pt plan of care to be determined as pt states he does not want to stay in the hospital. NP Shruthi Jaime notified, awaiting dispo.

## 2024-04-22 NOTE — H&P ADULT - HISTORY OF PRESENT ILLNESS
74-year-old male PMHx significant for HTN, LLE sciatica and carotid stent April 2021 who presents with chest pain  74-year-old male PMHx significant for HTN, LLE sciatica and carotid stent April 2021 who presents with chest pain for the past three days. Patient states that he was in his normal state of health but on Friday he felt bored and took 7 cups of coffee. He says he had left sided chest pain, worse with exertion, worse when walking and improved with rest. States that over the weekend, his chest pain improved but returned early this morning. States pain is on the left sided and does not radiate. States that sometimes if he rubs the area, the pain goes away. Otherwise no associated diaphoresis, but states that his feet was sweating instead. Denies any associated shortness of breath or cough. Otherwise has no other symptoms.    In the ED, patient's troponin was normal, EKG with twi, admitted for stress echo and further workup.

## 2024-04-22 NOTE — DISCHARGE NOTE PROVIDER - HOSPITAL COURSE
74-year-old male PMHx significant for HTN, LLE sciatica and carotid stent April 2021 who presents with chest pain for the past three days. Patient states that he was in his normal state of health but on Friday he felt bored and took 7 cups of coffee. He says he had left sided chest pain, worse with exertion, worse when walking and improved with rest. States that over the weekend, his chest pain improved but returned early this morning. States pain is on the left sided and does not radiate. States that sometimes if he rubs the area, the pain goes away. Otherwise no associated diaphoresis, but states that his feet was sweating instead. Denies any associated shortness of breath or cough. Otherwise has no other symptoms.    In the ED, patient's troponin was normal, EKG with twi, admitted for stress echo and further workup.  (22 Apr 2024 13:43). Stress echo was abnormal:         --There are medium-sized,mild to moderate defects in the inferolateral wall that are partially reversible suggestive of infarct with moderate quan-infarct ischemia.      --There are medium-sized, mild defects in the basal to mid anterior and basal anterolateral walls that are reversible suggestive of ischemia.      --There are medium-sized, moderate defects in the basal to mid inferior, basal to mid inferoseptal, and basal anteroseptal walls that are mostly fixed suggestive of infarct with mild quan-infarct ischemia.   --There is a small, mild defect in the apex that is fixed suggestive of infarct.    Pt admitted to [facility] for left heart catheterization.      74-year-old male PMHx significant for HTN, LLE sciatica and carotid stent April 2021 who presents with chest pain for the past three days. Patient states that he was in his normal state of health but on Friday he felt bored and took 7 cups of coffee. He says he had left sided chest pain, worse with exertion, worse when walking and improved with rest. States that over the weekend, his chest pain improved but returned early this morning. States pain is on the left sided and does not radiate. States that sometimes if he rubs the area, the pain goes away. Otherwise no associated diaphoresis, but states that his feet was sweating instead. Denies any associated shortness of breath or cough. Otherwise has no other symptoms.    In the ED, patient's troponin was normal, EKG with TWI, admitted for stress echo and further workup.  (22 Apr 2024 13:43). Stress echo was abnormal:        --There are medium-sized, mild to moderate defects in the inferolateral wall that are partially reversible suggestive of infarct with moderate quan-infarct ischemia.     --There are medium-sized, mild defects in the basal to mid anterior and basal anterolateral walls that are reversible suggestive of ischemia.     --There are medium-sized, moderate defects in the basal to mid inferior, basal to mid inferoseptal, and basal anteroseptal walls that are mostly fixed suggestive of infarct with mild quan-infarct ischemia.   --There is a small, mild defect in the apex that is fixed suggestive of infarct.    Pt admitted to [facility] for left heart catheterization.     He underwent LHC on 4/23 - per cardiology, no obstructive CAD. No cardiac contraindications to discharge.    Continue home medications- aspirin, Lipitor, Losartan, amlodipine     Follow-up outpatient with PCP and cardiology.

## 2024-04-22 NOTE — DISCHARGE NOTE PROVIDER - PROVIDER TOKENS
PROVIDER:[TOKEN:[[id number]],FOLLOWUP:[2 weeks]] PROVIDER:[TOKEN:[[id number]],FOLLOWUP:[2 weeks]],PROVIDER:[TOKEN:[[id number]],FOLLOWUP:[2 weeks]]

## 2024-04-22 NOTE — H&P ADULT - NSHPREVIEWOFSYSTEMS_GEN_ALL_CORE
REVIEW OF SYSTEMS:    CONSTITUTIONAL/GENERAL: No weakness, fevers or chills  EYES/OPHTHALMOLOGIC: No visual changes, No blurry vision, No vertigo   ENMT: No throat pain, or neck stiffness   RESPIRATORY/THORAX: No cough, wheezing, hemoptysis; No shortness of breath  CARDIOVASCULAR: No chest pain or palpitations  GASTROINTESTINAL: No abdominal or epigastric pain. No nausea, vomiting, or hematemesis; No diarrhea or constipation. No melena or hematochezia.  GENITOURINARY: No dysuria, frequency or hematuria  NEUROLOGICAL: No numbness or weakness  SKIN: No itching, rashes  ENDOCRINOLOGY: no heat intolerance, no cold intolerance, no weight gain, no weight loss  PSYCHIATRIC:  no si/no hi, mood stable, no anxiety  MUSCULOSKELETAL SYSTEM:  no joint pain, no myalgias, no arthralgias, no joint swelling CONSTITUTIONAL/GENERAL: No weakness, fevers or chills  EYES/OPHTHALMOLOGIC: No visual changes, No blurry vision, No vertigo   ENMT: No throat pain, or neck stiffness   RESPIRATORY/THORAX: No cough, wheezing, hemoptysis; No shortness of breath  CARDIOVASCULAR: chest pain as described in hpi   GASTROINTESTINAL: No abdominal or epigastric pain. No nausea, vomiting, or hematemesis; No diarrhea or constipation. No melena or hematochezia.  GENITOURINARY: No dysuria, frequency or hematuria  NEUROLOGICAL: No numbness or weakness  SKIN: No itching, rashes  ENDOCRINOLOGY: no heat intolerance, no cold intolerance, no weight gain, no weight loss  PSYCHIATRIC:  no si/no hi, mood stable, no anxiety  MUSCULOSKELETAL SYSTEM:  no joint pain, no myalgias, no arthralgias, no joint swelling

## 2024-04-22 NOTE — DISCHARGE NOTE PROVIDER - NSDCCPTREATMENT_GEN_ALL_CORE_FT
PRINCIPAL PROCEDURE  Procedure: Left heart cardiac cath  Findings and Treatment: No heavy lifting for 2 weeks, no strenuous activity  ( pushing/ pulling) no driving for x 2 days,  you may shower 24 hours following procedure but no bathing or swimming for x1  week, no strenuous sex for x 1 week & follow up with your cardiologist in 1-2 week       PRINCIPAL PROCEDURE  Procedure: Left heart cardiac cath  Findings and Treatment: No heavy lifting for 2 weeks. No strenuous activity  (pushing/pulling). No driving for x 2 days. You may shower 24 hours following procedure but no bathing or swimming for x1 week, no strenuous sex for x 1 week & follow up with your cardiologist in 1-2 week.

## 2024-04-22 NOTE — ED PROVIDER NOTE - PROGRESS NOTE DETAILS
Patient resting comfortably in bed, endorsing significant improvement in pain.  Initial troponin: 11, repeat ordered. Given cardiac risk factors, conversation of risks and benefits was had with patient and family at bedside.  Recommendation at this time is for cardiac stress testing performed while here at hospital.  Patient expresses understanding and is amenable to further cardiac workup to rule out underlying cardiac etiology. Meng Guido MD - PGY1: I have been given all relevant clinical information regarding this patient and will be assuming care from the previous provider. Patient TBA tele med for cardiac work up. No CDU availability. Initial clinical suspicion for MSK etiology but given futher conversation with patient and family patient w/ extensive cardiac hx, acute spontaneous chest pain w/ diaphoresis that woke patient from sleep. Patient is high risk and warrants further cardiac evaluation.

## 2024-04-22 NOTE — H&P ADULT - NSHPLABSRESULTS_GEN_ALL_CORE
EKG personally reviewed: Initial EKG 4/21 was NSR, with small twi ii, iii, avf, EKG on 4/22 shows NSR with PACs  CXR personally reviewed: no focal consolidation, no pleff, clear lungs  Labs below are personally reviewed:

## 2024-04-22 NOTE — CONSULT NOTE ADULT - SUBJECTIVE AND OBJECTIVE BOX
Patient seen and evaluated at bedside    Chief Complaint: chest pain    HPI: PT is a 74-year-old male PMHx significant for HTN, LLE sciatica and carotid stent April 2021 who presents with chest pain. On Friday, he drank 7 cups of coffee (usually drinks 2) and afterwards developed sweating and chest pain. This has continued through this morning, which caused him to present to ED. Of note, he has been having L anterior chest pain "for years" and this pain feels the same. He has been told in the past that the pain is noncardiac. He denies exertional CP, LUJAN, orthopnea, leg swelling. Has been taking same medication regimen (lisinopril, amlodipine, asa, lipitor) since carotid stent in 2021.     Of note, pt prior 4ppd smoker x30 years, quit 12 years ago. Family history of CAD in mother and father and DM in brother.     In the ED, troponins negative 11-->10. CXR clear. Hypertensive but improving. Cardiology consulted for chest pain in patient with prior vascular disease.     PMHx:   Essential hypertension    H/O carotid stenosis        PSHx:   History of common carotid artery stent placement        Allergies:  No Known Allergies            REVIEW OF SYSTEMS:  CONSTITUTIONAL: No weakness, fevers or chills  EYES/ENT: No visual changes;  No dysphagia  NECK: No pain or stiffness  RESPIRATORY: No cough, wheezing, hemoptysis; No shortness of breath  CARDIOVASCULAR: +reproducible chest pain as an HPI. No palpitations; No lower extremity edema  GASTROINTESTINAL: No abdominal or epigastric pain. No nausea, vomiting, or hematemesis; No diarrhea or constipation. No melena or hematochezia.  GENITOURINARY: No dysuria, frequency or hematuria  NEUROLOGICAL: No numbness or weakness  SKIN: No itching, burning, rashes, or lesions   All other review of systems is negative unless indicated above.    Physical Exam:  T(F): 98.6 (04-22), Max: 98.6 (04-22)  HR: 74 (04-22) (74 - 87)  BP: 159/77 (04-22) (159/77 - 185/88)  RR: 16 (04-22)  SpO2: 96% (04-22)  Gen: NAD.  HEENT: NCAT. PERRLA b/l.  Neck: No JVP elev.  CV: Normal S1, S2. RRR. II/VI systolic murmur loudest over apex  Chest: CTAB. No WRR. +reproducible L pectoral pain with palpation but not with resisted shoulder adduction with shoulder flexed  Abd: +BSx4. Soft. NTND.  Ext: No LE edema.  Skin: No cyanosis.    Cardiovascular Diagnostic Testing:    ECG: Personally reviewed:    Echo: Personally reviewed:    Stress Testing:    Cath:    Imaging:    CXR: Personally reviewed    Labs: Personally reviewed                        14.4   9.67  )-----------( 216      ( 22 Apr 2024 06:03 )             44.2     04-22    139  |  107  |  16  ----------------------------<  118<H>  4.2   |  20<L>  |  0.72    Ca    9.3      22 Apr 2024 06:03    TPro  7.5  /  Alb  4.1  /  TBili  0.5  /  DBili  x   /  AST  27  /  ALT  17  /  AlkPhos  85  04-22        CARDIAC MARKERS ( 22 Apr 2024 07:24 )  10 ng/L / x     / x     / x     / x     / x      CARDIAC MARKERS ( 22 Apr 2024 06:03 )  11 ng/L / x     / x     / x     / x     / x

## 2024-04-22 NOTE — DISCHARGE NOTE PROVIDER - NSDCCPCAREPLAN_GEN_ALL_CORE_FT
PRINCIPAL DISCHARGE DIAGNOSIS  Diagnosis: Chest pain  Assessment and Plan of Treatment:      PRINCIPAL DISCHARGE DIAGNOSIS  Diagnosis: Chest pain  Assessment and Plan of Treatment: Low salt, low fat diet.   Weight management.   Take medications as prescribed.    No smoking.  Follow up appointments with your doctor(s)  as instruced.       PRINCIPAL DISCHARGE DIAGNOSIS  Diagnosis: Chest pain  Assessment and Plan of Treatment: You underwent a stress test which was abnormal. You underwent a left heart cardiac catheterization on 4/23/24 which showed no obstructions.  Continue aspirin, Lipitor, Losartan and amlodipine.   Weight management.   Take medications as prescribed.   No smoking.  Follow up appointments with your doctor(s) as instruced.

## 2024-04-22 NOTE — DISCHARGE NOTE PROVIDER - ATTENDING DISCHARGE PHYSICAL EXAMINATION:
Vital Signs Last 24 Hrs  T(C): 36.8 (23 Apr 2024 12:14), Max: 36.8 (23 Apr 2024 04:47)  T(F): 98.2 (23 Apr 2024 12:14), Max: 98.2 (23 Apr 2024 04:47)  HR: 78 (23 Apr 2024 13:20) (67 - 92)  BP: 194/100 (23 Apr 2024 13:20) (118/65 - 194/100)  BP(mean): 107 (23 Apr 2024 13:20) (83 - 107)  RR: 16 (23 Apr 2024 13:20) (16 - 18)  SpO2: 96% (23 Apr 2024 13:20) (94% - 97%)    Parameters below as of 23 Apr 2024 14:10  Patient On (Oxygen Delivery Method): room air        CONSTITUTIONAL: Well-groomed, in no apparent distress  EYES: No conjunctival or scleral injection, non-icteric;   ENMT: No external nasal lesions; MMM  NECK: Trachea midline   RESPIRATORY: Breathing comfortably; lungs CTA without wheeze/rhonchi/rales  CARDIOVASCULAR: +S1S2, RRR  GASTROINTESTINAL: No palpable masses or tenderness, no rebound/guarding  SKIN: Warm, dry  NEUROLOGIC: CN II-XII intact; sensation intact in LEs b/l to light touch  PSYCHIATRIC: A+O x 3; mood and affect appropriate; appropriate insight and judgment

## 2024-04-22 NOTE — DISCHARGE NOTE PROVIDER - NSDCACTIVITY_GEN_ALL_CORE
Showering allowed/Walking - Indoors allowed/Walking - Outdoors allowed No restrictions/Showering allowed/Walking - Indoors allowed/Walking - Outdoors allowed Showering allowed/Walking - Indoors allowed/No heavy lifting/straining/Walking - Outdoors allowed

## 2024-04-22 NOTE — ED PROVIDER NOTE - ATTENDING CONTRIBUTION TO CARE
Patient with history of CAD with a stent placed 3 years ago, not currently followed by cardiology, presents with  3 days of intermittent episodes of chest pain, sharp, nonexertional, associated with shortness of breath and some shortness of breath.  On exam patient is well-appearing, hypertensive, otherwise unremarkable vital signs, but somewhat reproducible chest pain on the left side of his chest.  Workup in the ED unremarkable.  Due to his cardiac history, he is high risk for cardiac pathology and will be admitted for cards eval and possible provocative testing.

## 2024-04-22 NOTE — ED ADULT NURSE REASSESSMENT NOTE - NS ED NURSE REASSESS COMMENT FT1
Received a 74M aaox4 ambulatory with h/o HTN, carotid stent (placed 3 years prior) on ASA, LLE sciatica, presents to ED with left-sided CP onset 3 days prior. Patient states on Friday having 7 cups of coffee, later on that night having sudden onset of left-sided chest pain, non-radiating, not associated with sob or diaphoresis. Patient had 2 troponins done, admitted to Telemetry for CP needing further work up. VS WDL at this time, pending to be seen by cardiologists.

## 2024-04-22 NOTE — H&P ADULT - NSHPPHYSICALEXAM_GEN_ALL_CORE
GENERAL: NAD, well-developed  HEAD:  Atraumatic, Normocephalic  EYES: EOMI, PERRLA, conjunctiva and sclera clear  NECK: Supple, No JVD  CHEST/LUNG: Clear to auscultation bilaterally; No wheeze  HEART: Regular rate and rhythm; No murmurs, rubs, or gallops  ABDOMEN: Soft, Nontender, Nondistended; Bowel sounds present  EXTREMITIES:  2+ Peripheral Pulses, No clubbing, cyanosis, or edema  PSYCH: AAOx3, appropriate affect  NEUROLOGY: non-focal, antoine  SKIN: No rashes or lesions

## 2024-04-22 NOTE — ED ADULT NURSE NOTE - OBJECTIVE STATEMENT
Pt is a 73yo M w/ PMH stroke (3 years ago), carotid artery stent, presents to ED c/o left-sided anterior chest wall pain. Pt states "It came on suddenly this morning, I had some weird feeling in my left leg too. It was about a 4/10, then it got better, I don't feel pain right now." Denies SOB, N/V/D, weakness, dizziness, lightheadedness, blood in stools, hematuria, dysuria, urinary frequency, fevers/chills, sick contacts. A&Ox4. Strong peripheral pulses. Neurologically intact and follows commands. Pulse motor and sensation present and equal to all 4 extremities. Abdomen soft, nondistended, nontender to palpation. Skin warm dry intact and normal for ethnicity. Ambulatory with steady gait in ED. Stretcher locked and in lowest position, appropriate side rails up. Pt instructed to notify RN if assistance is needed.

## 2024-04-22 NOTE — ED PROVIDER NOTE - OBJECTIVE STATEMENT
Tk Johansen is a 74 y.o. M PMHx significant for HTN, carotid stent (placed 3 years prior) on ASA, LLE sciatica, presents to ED with left-sided CP onset 3 days prior. Patient states on Friday having 7 cups of coffee, later on that night having sudden onset of left-sided chest pain, non-radiating, not associated with N/V, N/T, SOB, diaphoresis. CP is exacerbated by movement and improves with rest. Denies taking any medications for pain. Saturday and Sunday patient reports improvement in pain until late Sunday night when chest pain returned. Approximately around 12:30 AM, patient also endorsed right eye twitching.  Given continued CP patient presented to the ED tonight.  Denies URI symptoms, ABD pain, changes in urination or BM. Tk Johansen is a 74 y.o. M PMHx significant for HTN, carotid stent (placed 3 years prior) on ASA, LLE sciatica, presents to ED with left-sided CP onset 3 days prior. Patient states on Friday having 7 cups of coffee, later on that night having sudden onset of left-sided chest pain, non-radiating, not associated with N/V, N/T, SOB, diaphoresis. CP is exacerbated by movement and improves with rest. Denies taking any medications for pain. Saturday and Sunday patient reports improvement in pain until late Sunday night when chest pain returned. Approximately around 12:30 AM, patient also endorsed right eye twitching.  Given continued CP patient presented to the ED tonight.  Denies URI symptoms, ABD pain, changes in urination or BM.    Of note pt does endorse lifting heavy object week before onset of symptoms.

## 2024-04-22 NOTE — ED PROVIDER NOTE - PHYSICAL EXAMINATION
GEN: Patient awake and alert. No acute distress, non-toxic.  Head: normocephalic, atraumatic.  CARDIAC: RRR. Normal S1, S2. No murmur, rubs, or gallops. No peripheral edema noted. reproducible L. sub-clavicular chest tenderness. Skin appears red.    PULM: CTA B/L no wheeze, rales or rhonchi. No signs of respiratory distress  ABD: Soft, nontender, nondistended  : No CVA tenderness,  MSK: Moving all extremities spontaneously.   NEURO: A&Ox3  SKIN: Warm, dry. no rashes. GEN: Patient awake and alert. No acute distress, non-toxic.  Head: normocephalic, atraumatic.  CARDIAC: RRR. Normal S1, S2. No murmur, rubs, or gallops. No peripheral edema noted. reproducible L. sub-clavicular chest tenderness. Skin appears red.    PULM: CTA B/L no wheeze, rales or rhonchi. No signs of respiratory distress  ABD: Soft, nontender, nondistended  : No CVA tenderness,  MSK: Moving all extremities spontaneously.   NEURO: A&Ox3. CN II-XII intact, 5/5 strength in all extremities, normal sensation throughout.  Normal finger-to-nose testing, negative Romberg.  SKIN: Warm, dry. no rashes.

## 2024-04-22 NOTE — CONSULT NOTE ADULT - ASSESSMENT
Pt is a 74-year-old male PMHx significant for HTN, LLE sciatica and carotid stent April 2021 who presents with chest pain, most likely musculoskeletal with possible contribution from caffeine/anxiety ("I'm a very anxious person") vs less likely anginal chest pain.     #Chest pain  - reasonable to rule out anginal/ischemic chest pain given smoking history, vascular disease (carotid stent), family history of CAD  - start with TTE and nuclear stress test (ordered) and pending results --> +/- angiogram   - continue with home ACE/arb (family reports lisinopril though dc med rec from last admission lists losartan), amlodipine, atorvastatin 80, and asa81  - discussed plan in detail with patient and nephew at bedside, answering all questions.     **Note not finalized until signed by attending** Pt is a 74-year-old male PMHx significant for HTN, LLE sciatica and carotid stent April 2021 who presents with chest pain, most likely musculoskeletal with possible contribution from caffeine/anxiety ("I'm a very anxious person") vs less likely anginal chest pain.     #Chest pain  - reasonable to rule out anginal/ischemic chest pain given smoking history, vascular disease (carotid stent), family history of CAD  - ECG with ~1mm ST depressions in II, III, aVF  - start with TTE and nuclear stress test (ordered) and pending results --> +/- angiogram   - continue with home ACE/arb (family reports lisinopril though dc med rec from last admission lists losartan), amlodipine, atorvastatin 80, and asa81  - discussed plan in detail with patient and nephew at bedside, answering all questions.     **Note not finalized until signed by attending**

## 2024-04-22 NOTE — H&P ADULT - NSHPSOCIALHISTORY_GEN_ALL_CORE
Tobacco Use:  4ppd smoker x30 years, quit 12 years ago  Alcohol Use: denies etoh intake  Drug use: denies marijuana, cocaine, heroine and other illicit drug use

## 2024-04-22 NOTE — H&P ADULT - ASSESSMENT
74-year-old male PMHx significant for HTN, LLE sciatica and carotid stent April 2021 who presents with chest pain, admitted for ischemic eval.        74-year-old male PMHx significant for HTN, LLE sciatica and carotid stent April 2021 who presents with chest pain, admitted for ischemic eval.

## 2024-04-22 NOTE — DISCHARGE NOTE PROVIDER - CARE PROVIDERS DIRECT ADDRESSES
[email or web address]

## 2024-04-22 NOTE — DISCHARGE NOTE PROVIDER - NSDCMRMEDTOKEN_GEN_ALL_CORE_FT
amLODIPine 10 mg oral tablet: 1 tab(s) orally once a day  aspirin 81 mg oral tablet: 1 tab(s) orally once a day  Lipitor 20 mg oral tablet: 1 tab(s) orally once a day  losartan 50 mg oral tablet: 1 tab(s) orally once a day

## 2024-04-22 NOTE — H&P ADULT - PROBLEM SELECTOR PLAN 1
Presents with few days of chest pain, exertional, and with some twave changes, although patient's troponin has been normal x2  - Cardiology recs appreciated: obtain TTE and stress echo, possible angiogram if these are abnormal  - if CP recurs, obtain EKG and cardiac enzymes Presents with few days of chest pain, exertional, and with some twave changes, although patient's troponin has been normal x2  - Cardiology recs appreciated: obtain TTE and stress echo shows CP during pharmacologic test,  0.5mm additional downsloping ST segment depressions in II, III, AVF during regadenoson infusion, multiple perfusion defects, quan-infarc ischemia. and post-stress shows hypokinesis of the basal to mid inferior, basal to mid inferoseptal, and basal anteroseptal walls and reduced systolic thickening of the basal anterior and apical walls.  - spoke with cardiology fellow Dr. Smith ->  possible cath in AM  - if CP recurs, obtain EKG and cardiac enzymes

## 2024-04-22 NOTE — ED PROVIDER NOTE - CLINICAL SUMMARY MEDICAL DECISION MAKING FREE TEXT BOX
74-year-old male PMHx significant for HTN, DM, LLE sciatica presenting with left-sided chest pain onset 3 days prior. Physical exam remarkable for reproducible chest wall tenderness to the area in question.  Otherwise heart RRR, lungs CTA, abdomen soft, nontender. Given HPI concerning differentials include but not limited to ACS, pneumonia, costochondritis, musculoskeletal etiology.  PE is considered however, no shortness of breath, stable vital signs, no history of recent travel or recent surgery, while patient does not PERC negative 2/2 age, low concern for this etiology given reassuring physical exam and vitals. At this time MSK etiology seems most likely. Will obtain basic screening labs including CBC, CMP.  Additionally will obtain chest x-ray, EKG and troponin to evaluate for cardiac etiology.  Will provide acetaminophen for pain relief at this time and reevaluate.

## 2024-04-22 NOTE — DISCHARGE NOTE PROVIDER - CARE PROVIDER_API CALL
Elmer Subramanian  Cardiology  [address] [facility] [facility]  [place] [address]  Phone: [phone]  Fax: [phone]  Follow Up Time: 2 weeks   Elmer Subramanian  Cardiology  [address], [address]  [place] [address]  Phone: [phone]  Fax: [phone]  Follow Up Time: 2 weeks    Shelton Hernandez  [place] [facility], [address]  [place] [address]  Phone: [phone]  Fax: [phone]  Follow Up Time: 2 weeks

## 2024-04-22 NOTE — ED ADULT NURSE REASSESSMENT NOTE - NS ED NURSE REASSESS COMMENT FT1
Patient remains stable while in the ED, VS WDL. Patient admitted to telemetry for Chest pain pending bed availability. Patient was seen by Cardiologist at bedside, ordered Nuclear stress test for patient. No pending medications ordered. Patient denies any chest pain at the moment. Will continue to monitor.

## 2024-04-22 NOTE — CONSULT NOTE ADULT - ATTENDING COMMENTS
Patient with known cardiovascular risk factors as above, presents with atypical chest discomfort that is different than his baseline chest discomfort.  Plan for TTE and nuclear stress test.

## 2024-04-23 ENCOUNTER — TRANSCRIPTION ENCOUNTER (OUTPATIENT)
Age: 74
End: 2024-04-23

## 2024-04-23 VITALS
SYSTOLIC BLOOD PRESSURE: 132 MMHG | HEART RATE: 69 BPM | RESPIRATION RATE: 18 BRPM | TEMPERATURE: 98 F | OXYGEN SATURATION: 95 % | DIASTOLIC BLOOD PRESSURE: 66 MMHG

## 2024-04-23 LAB
A1C WITH ESTIMATED AVERAGE GLUCOSE RESULT: 5.5 % — SIGNIFICANT CHANGE UP (ref 4–5.6)
ANION GAP SERPL CALC-SCNC: 8 MMOL/L — SIGNIFICANT CHANGE UP (ref 5–17)
BUN SERPL-MCNC: 16 MG/DL — SIGNIFICANT CHANGE UP (ref 7–23)
CALCIUM SERPL-MCNC: 8.8 MG/DL — SIGNIFICANT CHANGE UP (ref 8.4–10.5)
CHLORIDE SERPL-SCNC: 110 MMOL/L — HIGH (ref 96–108)
CO2 SERPL-SCNC: 22 MMOL/L — SIGNIFICANT CHANGE UP (ref 22–31)
CREAT SERPL-MCNC: 0.91 MG/DL — SIGNIFICANT CHANGE UP (ref 0.5–1.3)
EGFR: 88 ML/MIN/1.73M2 — SIGNIFICANT CHANGE UP
ESTIMATED AVERAGE GLUCOSE: 111 MG/DL — SIGNIFICANT CHANGE UP (ref 68–114)
GLUCOSE SERPL-MCNC: 128 MG/DL — HIGH (ref 70–99)
HCT VFR BLD CALC: 40.4 % — SIGNIFICANT CHANGE UP (ref 39–50)
HGB BLD-MCNC: 13.3 G/DL — SIGNIFICANT CHANGE UP (ref 13–17)
MCHC RBC-ENTMCNC: 30.1 PG — SIGNIFICANT CHANGE UP (ref 27–34)
MCHC RBC-ENTMCNC: 32.9 GM/DL — SIGNIFICANT CHANGE UP (ref 32–36)
MCV RBC AUTO: 91.4 FL — SIGNIFICANT CHANGE UP (ref 80–100)
NRBC # BLD: 0 /100 WBCS — SIGNIFICANT CHANGE UP (ref 0–0)
PLATELET # BLD AUTO: 156 K/UL — SIGNIFICANT CHANGE UP (ref 150–400)
POTASSIUM SERPL-MCNC: 4.3 MMOL/L — SIGNIFICANT CHANGE UP (ref 3.5–5.3)
POTASSIUM SERPL-SCNC: 4.3 MMOL/L — SIGNIFICANT CHANGE UP (ref 3.5–5.3)
RBC # BLD: 4.42 M/UL — SIGNIFICANT CHANGE UP (ref 4.2–5.8)
RBC # FLD: 14.6 % — HIGH (ref 10.3–14.5)
SODIUM SERPL-SCNC: 140 MMOL/L — SIGNIFICANT CHANGE UP (ref 135–145)
WBC # BLD: 8.35 K/UL — SIGNIFICANT CHANGE UP (ref 3.8–10.5)
WBC # FLD AUTO: 8.35 K/UL — SIGNIFICANT CHANGE UP (ref 3.8–10.5)

## 2024-04-23 PROCEDURE — 93306 TTE W/DOPPLER COMPLETE: CPT

## 2024-04-23 PROCEDURE — 93454 CORONARY ARTERY ANGIO S&I: CPT | Mod: 26

## 2024-04-23 PROCEDURE — 80053 COMPREHEN METABOLIC PANEL: CPT

## 2024-04-23 PROCEDURE — C1769: CPT

## 2024-04-23 PROCEDURE — C1894: CPT

## 2024-04-23 PROCEDURE — 36415 COLL VENOUS BLD VENIPUNCTURE: CPT

## 2024-04-23 PROCEDURE — 83036 HEMOGLOBIN GLYCOSYLATED A1C: CPT

## 2024-04-23 PROCEDURE — 99233 SBSQ HOSP IP/OBS HIGH 50: CPT

## 2024-04-23 PROCEDURE — 84484 ASSAY OF TROPONIN QUANT: CPT

## 2024-04-23 PROCEDURE — 99239 HOSP IP/OBS DSCHRG MGMT >30: CPT

## 2024-04-23 PROCEDURE — 85027 COMPLETE CBC AUTOMATED: CPT

## 2024-04-23 PROCEDURE — 93005 ELECTROCARDIOGRAM TRACING: CPT

## 2024-04-23 PROCEDURE — 93454 CORONARY ARTERY ANGIO S&I: CPT

## 2024-04-23 PROCEDURE — 78452 HT MUSCLE IMAGE SPECT MULT: CPT | Mod: MC

## 2024-04-23 PROCEDURE — 93017 CV STRESS TEST TRACING ONLY: CPT

## 2024-04-23 PROCEDURE — 71046 X-RAY EXAM CHEST 2 VIEWS: CPT

## 2024-04-23 PROCEDURE — 93356 MYOCRD STRAIN IMG SPCKL TRCK: CPT

## 2024-04-23 PROCEDURE — 99285 EMERGENCY DEPT VISIT HI MDM: CPT

## 2024-04-23 PROCEDURE — C1887: CPT

## 2024-04-23 PROCEDURE — 85025 COMPLETE CBC W/AUTO DIFF WBC: CPT

## 2024-04-23 PROCEDURE — 80048 BASIC METABOLIC PNL TOTAL CA: CPT

## 2024-04-23 PROCEDURE — A9500: CPT

## 2024-04-23 RX ORDER — SODIUM CHLORIDE 9 MG/ML
1000 INJECTION INTRAMUSCULAR; INTRAVENOUS; SUBCUTANEOUS
Refills: 0 | Status: DISCONTINUED | OUTPATIENT
Start: 2024-04-23 | End: 2024-04-23

## 2024-04-23 RX ORDER — SODIUM CHLORIDE 9 MG/ML
250 INJECTION INTRAMUSCULAR; INTRAVENOUS; SUBCUTANEOUS ONCE
Refills: 0 | Status: COMPLETED | OUTPATIENT
Start: 2024-04-23 | End: 2024-04-23

## 2024-04-23 RX ADMIN — AMLODIPINE BESYLATE 10 MILLIGRAM(S): 2.5 TABLET ORAL at 05:08

## 2024-04-23 RX ADMIN — Medication 81 MILLIGRAM(S): at 05:08

## 2024-04-23 RX ADMIN — ATORVASTATIN CALCIUM 20 MILLIGRAM(S): 80 TABLET, FILM COATED ORAL at 05:08

## 2024-04-23 RX ADMIN — SODIUM CHLORIDE 250 MILLILITER(S): 9 INJECTION INTRAMUSCULAR; INTRAVENOUS; SUBCUTANEOUS at 08:05

## 2024-04-23 RX ADMIN — SODIUM CHLORIDE 75 MILLILITER(S): 9 INJECTION INTRAMUSCULAR; INTRAVENOUS; SUBCUTANEOUS at 08:05

## 2024-04-23 NOTE — PROGRESS NOTE ADULT - ASSESSMENT
Pt is a 74-year-old male PMHx significant for HTN, LLE sciatica and carotid stent April 2021 who presents with chest pain, most likely musculoskeletal with possible contribution from caffeine/anxiety vs anginal chest pain.     #Chest pain  - reasonable to rule out anginal/ischemic chest pain given smoking history, vascular disease (carotid stent), family history of CAD  - ECG with ~1mm ST depressions in II, III, aVF  - TTE with an ejection fraction of 69 % and no regional wall motion abnormalities seen  - nuclear stress test with several mild-moderate perfusion defects, scheduled for cath today 4/23  - follow up cath results  - continue with home ACE/arb (family reports lisinopril though dc med rec from last admission lists losartan), amlodipine, atorvastatin 80, and asa81  - discussed plan in detail with patient and nephew at bedside, answering all questions.     **Note not finalized until signed by attending**

## 2024-04-23 NOTE — DISCHARGE NOTE NURSING/CASE MANAGEMENT/SOCIAL WORK - PATIENT PORTAL LINK FT
You can access the FollowMyHealth Patient Portal offered by [facility] by registering at the following website: [email or web address]. By joining [facility]’s FollowMyHealth portal, you will also be able to view your health information using other applications (apps) compatible with our system.

## 2024-04-23 NOTE — PROGRESS NOTE ADULT - SUBJECTIVE AND OBJECTIVE BOX
Patient seen and examined at bedside.    Overnight Events: Imaging with [facility], plan for cath today. Pt feeling well with no complaints. Seen with family at bedside, all questions answered.     Review Of Systems: No chest pain, shortness of breath, or palpitations            Current Meds:  amLODIPine   Tablet 10 milliGRAM(s) Oral daily  aspirin  chewable 81 milliGRAM(s) Oral daily  atorvastatin 20 milliGRAM(s) Oral at bedtime  influenza  Vaccine (HIGH DOSE) 0.7 milliLiter(s) IntraMuscular once  melatonin 3 milliGRAM(s) Oral at bedtime PRN  sodium chloride 0.9%. 1000 milliLiter(s) IV Continuous <Continuous>      Vitals:  T(F): 97.8 (04-23), Max: 98.2 (04-23)  HR: 73 (04-23) (69 - 92)  BP: 124/58 (04-23) (124/58 - 154/79)  RR: 16 (04-23)  SpO2: 95% (04-23)  I&O's Summary    23 Apr 2024 07:01  -  23 Apr 2024 11:07  --------------------------------------------------------  IN: 120 mL / OUT: 0 mL / NET: 120 mL        Physical Exam:  Gen: NAD.  HEENT: NCAT. PERRLA b/l.  Neck: No JVP elev.  CV: Normal S1, S2. RRR. 2/6 systolic murmur  Chest: CTAB. No WRR.  Abd: +BSx4. Soft. NTND.  Ext: No LE edema.  Skin: No cyanosis.                          13.3   8.35  )-----------( 156      ( 23 Apr 2024 00:48 )             40.4     04-23    140  |  110<H>  |  16  ----------------------------<  128<H>  4.3   |  22  |  0.91    Ca    8.8      23 Apr 2024 00:48    TPro  7.5  /  Alb  4.1  /  TBili  0.5  /  DBili  x   /  AST  27  /  ALT  17  /  AlkPhos  85  04-22      CARDIAC MARKERS ( 22 Apr 2024 07:24 )  10 ng/L / x     / x     / x     / x     / x      CARDIAC MARKERS ( 22 Apr 2024 06:03 )  11 ng/L / x     / x     / x     / x     / x

## 2024-04-30 PROBLEM — Z86.79 PERSONAL HISTORY OF OTHER DISEASES OF THE CIRCULATORY SYSTEM: Chronic | Status: ACTIVE | Noted: 2024-04-22

## 2024-05-01 ENCOUNTER — NON-APPOINTMENT (OUTPATIENT)
Age: 74
End: 2024-05-01

## 2024-05-01 ENCOUNTER — APPOINTMENT (OUTPATIENT)
Dept: INTERNAL MEDICINE | Facility: CLINIC | Age: 74
End: 2024-05-01
Payer: MEDICARE

## 2024-05-01 VITALS
WEIGHT: 181 LBS | DIASTOLIC BLOOD PRESSURE: 73 MMHG | HEIGHT: 70 IN | OXYGEN SATURATION: 97 % | BODY MASS INDEX: 25.91 KG/M2 | SYSTOLIC BLOOD PRESSURE: 144 MMHG | HEART RATE: 70 BPM

## 2024-05-01 DIAGNOSIS — Z00.00 ENCOUNTER FOR GENERAL ADULT MEDICAL EXAMINATION W/OUT ABNORMAL FINDINGS: ICD-10-CM

## 2024-05-01 DIAGNOSIS — Z87.891 PERSONAL HISTORY OF NICOTINE DEPENDENCE: ICD-10-CM

## 2024-05-01 DIAGNOSIS — I10 ESSENTIAL (PRIMARY) HYPERTENSION: ICD-10-CM

## 2024-05-01 DIAGNOSIS — I65.29 OCCLUSION AND STENOSIS OF UNSPECIFIED CAROTID ARTERY: ICD-10-CM

## 2024-05-01 DIAGNOSIS — R05.9 COUGH, UNSPECIFIED: ICD-10-CM

## 2024-05-01 DIAGNOSIS — E55.9 VITAMIN D DEFICIENCY, UNSPECIFIED: ICD-10-CM

## 2024-05-01 PROCEDURE — G0439: CPT

## 2024-05-01 PROCEDURE — 36415 COLL VENOUS BLD VENIPUNCTURE: CPT

## 2024-05-01 PROCEDURE — 93000 ELECTROCARDIOGRAM COMPLETE: CPT

## 2024-05-01 RX ORDER — ATORVASTATIN CALCIUM 80 MG/1
80 TABLET, FILM COATED ORAL
Refills: 0 | Status: ACTIVE | COMMUNITY

## 2024-05-01 RX ORDER — ASPIRIN 81 MG
81 TABLET, DELAYED RELEASE (ENTERIC COATED) ORAL
Refills: 0 | Status: ACTIVE | COMMUNITY

## 2024-05-01 RX ORDER — AMLODIPINE BESYLATE 10 MG/1
10 TABLET ORAL
Refills: 0 | Status: ACTIVE | COMMUNITY

## 2024-05-01 RX ORDER — LOSARTAN POTASSIUM 100 MG/1
100 TABLET, FILM COATED ORAL
Refills: 0 | Status: ACTIVE | COMMUNITY

## 2024-05-01 RX ORDER — TICAGRELOR 90 MG/1
90 TABLET ORAL
Refills: 0 | Status: DISCONTINUED | COMMUNITY
End: 2024-05-01

## 2024-05-01 NOTE — HISTORY OF PRESENT ILLNESS
[de-identified] : 74 year old male with h/o left sided sciatica, carotid stenosis s/p right carotid stent placement, HTN, HLD presents for initial annual exam.  was seen in [facility] due to CP.  tropx2-negative.  echo- unremarkable with abnormal stress echo.  coronary cath- no obstruction noted.  declined colonoscopy or any other colon CA screening   single, +sexually active  employed- construction  former smoker, 13 years.

## 2024-05-01 NOTE — HEALTH RISK ASSESSMENT
[Good] : ~his/her~  mood as  good [No falls in past year] : Patient reported no falls in the past year [0] : 2) Feeling down, depressed, or hopeless: Not at all (0) [PHQ-2 Negative - No further assessment needed] : PHQ-2 Negative - No further assessment needed [[id number]] : 0 [Reports changes in hearing] : Reports no changes in hearing [Reports changes in vision] : Reports no changes in vision [ColonoscopyDate] : never [Former] : Former [20 or more] : 20 or more [< 15 Years] : < 15 Years

## 2024-05-01 NOTE — ASSESSMENT
[FreeTextEntry1] : //  h/o chest pain.  tropx2-negative.  echo- unremarkable with abnormal stress echo.  coronary cath- no obstruction noted. -recommend to establish care with cardiology   Intermittent cough, former smoker, 20 Pack years.  quit 13 years ago.  normal chest xray in hospital -check CT chest   Carotid stenosis s/p right carotid stent placement,  -cont aspirin, statin  -repeat carotid doppler   Hypertension, controlled  -cont amlodipine as directed  -educated that increased blood pressure is linked to but limited to increase risk of heart disease, stroke, kidney failure and even death. stressed the importance to lower values by complying to a less than 2g sodium diet, moderate cardiovascular exercise and decrease stress level as recommended by the SPRINT Trial  -advised to check blood pressure at home with blood pressure cuff at different times of the day and bring to next appt, to eliminate possibility of white coat syndrome.  Hyperlipidemia -cont atorvastatin, aspirin  -Will check labs  -Advised decrease greasy, fatty foods, increase exercise, fiber intake  -Elevated cholesterol has been linked to increase in cardiovascular even such as heart attack, stroke, peripheral artery disease and death  Healthcare Maintenance -Advise Yearly Skin cancer screening with Dermatologist  -Advise Yearly Eye exam with Ophthalmologist -Advise Yearly Dental exam -Educated of the importance of Healthy diet, such as Mediterranean Diet and Exercise, such as walking >20 minutes a day and increasing gradually as tolerated  Immunizations -Flu vaccine  -Covid vaccine  -Pneumonia vaccine -declined  -shingles vaccine (shingrix) -declined   Preventative screening  -advised to get colonoscopy for colon cancer screening -declined  -will check PSA for prostate cancer screening -labs drawn

## 2024-05-02 ENCOUNTER — CLINICAL ADVICE (OUTPATIENT)
Age: 74
End: 2024-05-02

## 2024-05-02 LAB
25(OH)D3 SERPL-MCNC: 52 NG/ML
ALBUMIN SERPL ELPH-MCNC: 3.9 G/DL
ALP BLD-CCNC: 77 U/L
ALT SERPL-CCNC: 18 U/L
ANION GAP SERPL CALC-SCNC: 13 MMOL/L
APPEARANCE: CLEAR
AST SERPL-CCNC: 22 U/L
BACTERIA: NEGATIVE /HPF
BASOPHILS # BLD AUTO: 0.05 K/UL
BASOPHILS NFR BLD AUTO: 0.6 %
BILIRUB DIRECT SERPL-MCNC: 0.2 MG/DL
BILIRUB INDIRECT SERPL-MCNC: 0.4 MG/DL
BILIRUB SERPL-MCNC: 0.5 MG/DL
BILIRUBIN URINE: NEGATIVE
BLOOD URINE: NEGATIVE
BUN SERPL-MCNC: 14 MG/DL
CALCIUM SERPL-MCNC: 9.1 MG/DL
CAST: 0 /LPF
CHLORIDE SERPL-SCNC: 104 MMOL/L
CHOLEST SERPL-MCNC: 130 MG/DL
CO2 SERPL-SCNC: 22 MMOL/L
COLOR: YELLOW
CREAT SERPL-MCNC: 0.84 MG/DL
EGFR: 91 ML/MIN/1.73M2
EOSINOPHIL # BLD AUTO: 0.43 K/UL
EOSINOPHIL NFR BLD AUTO: 5 %
EPITHELIAL CELLS: 0 /HPF
ESTIMATED AVERAGE GLUCOSE: 114 MG/DL
FERRITIN SERPL-MCNC: 108 NG/ML
FOLATE SERPL-MCNC: 19.7 NG/ML
GLUCOSE QUALITATIVE U: NEGATIVE MG/DL
GLUCOSE SERPL-MCNC: 95 MG/DL
HBA1C MFR BLD HPLC: 5.6 %
HCT VFR BLD CALC: 40.2 %
HDLC SERPL-MCNC: 58 MG/DL
HGB BLD-MCNC: 13.4 G/DL
IMM GRANULOCYTES NFR BLD AUTO: 0.2 %
IRON SATN MFR SERPL: 23 %
IRON SERPL-MCNC: 61 UG/DL
KETONES URINE: NEGATIVE MG/DL
LDLC SERPL CALC-MCNC: 60 MG/DL
LEUKOCYTE ESTERASE URINE: NEGATIVE
LYMPHOCYTES # BLD AUTO: 1.76 K/UL
LYMPHOCYTES NFR BLD AUTO: 20.7 %
MAN DIFF?: NORMAL
MCHC RBC-ENTMCNC: 30.1 PG
MCHC RBC-ENTMCNC: 33.3 GM/DL
MCV RBC AUTO: 90.3 FL
MICROSCOPIC-UA: NORMAL
MONOCYTES # BLD AUTO: 0.85 K/UL
MONOCYTES NFR BLD AUTO: 10 %
NEUTROPHILS # BLD AUTO: 5.41 K/UL
NEUTROPHILS NFR BLD AUTO: 63.5 %
NITRITE URINE: NEGATIVE
NONHDLC SERPL-MCNC: 72 MG/DL
PH URINE: 5.5
PLATELET # BLD AUTO: 227 K/UL
POTASSIUM SERPL-SCNC: 4.5 MMOL/L
PROT SERPL-MCNC: 6.9 G/DL
PROTEIN URINE: NEGATIVE MG/DL
PSA FREE FLD-MCNC: 14 %
PSA FREE SERPL-MCNC: 0.39 NG/ML
PSA SERPL-MCNC: 2.81 NG/ML
RBC # BLD: 4.45 M/UL
RBC # FLD: 14.9 %
RED BLOOD CELLS URINE: 1 /HPF
SODIUM SERPL-SCNC: 139 MMOL/L
SPECIFIC GRAVITY URINE: 1.02
TIBC SERPL-MCNC: 261 UG/DL
TRIGL SERPL-MCNC: 55 MG/DL
TSH SERPL-ACNC: 2.48 UIU/ML
UIBC SERPL-MCNC: 200 UG/DL
UROBILINOGEN URINE: 0.2 MG/DL
VIT B12 SERPL-MCNC: 719 PG/ML
WBC # FLD AUTO: 8.52 K/UL
WHITE BLOOD CELLS URINE: 1 /HPF

## 2024-05-23 ENCOUNTER — APPOINTMENT (OUTPATIENT)
Dept: ULTRASOUND IMAGING | Facility: IMAGING CENTER | Age: 74
End: 2024-05-23
Payer: MEDICARE

## 2024-05-23 ENCOUNTER — APPOINTMENT (OUTPATIENT)
Dept: CT IMAGING | Facility: IMAGING CENTER | Age: 74
End: 2024-05-23
Payer: MEDICARE

## 2024-05-23 ENCOUNTER — OUTPATIENT (OUTPATIENT)
Dept: OUTPATIENT SERVICES | Facility: HOSPITAL | Age: 74
LOS: 1 days | End: 2024-05-23
Payer: MEDICARE

## 2024-05-23 DIAGNOSIS — Z98.890 OTHER SPECIFIED POSTPROCEDURAL STATES: Chronic | ICD-10-CM

## 2024-05-23 DIAGNOSIS — I65.29 OCCLUSION AND STENOSIS OF UNSPECIFIED CAROTID ARTERY: ICD-10-CM

## 2024-05-23 PROCEDURE — 93880 EXTRACRANIAL BILAT STUDY: CPT | Mod: 26

## 2024-05-23 PROCEDURE — 71250 CT THORAX DX C-: CPT | Mod: 26,MH

## 2024-05-23 PROCEDURE — 93880 EXTRACRANIAL BILAT STUDY: CPT

## 2024-05-23 PROCEDURE — 71250 CT THORAX DX C-: CPT

## 2024-05-28 ENCOUNTER — CLINICAL ADVICE (OUTPATIENT)
Age: 74
End: 2024-05-28

## 2024-06-04 ENCOUNTER — CLINICAL ADVICE (OUTPATIENT)
Age: 74
End: 2024-06-04

## 2024-06-04 DIAGNOSIS — R91.1 SOLITARY PULMONARY NODULE: ICD-10-CM

## 2024-06-04 DIAGNOSIS — K80.20 CALCULUS OF GALLBLADDER W/OUT CHOLECYSTITIS W/OUT OBSTRUCTION: ICD-10-CM
